# Patient Record
Sex: FEMALE | Race: BLACK OR AFRICAN AMERICAN | NOT HISPANIC OR LATINO | Employment: FULL TIME | URBAN - METROPOLITAN AREA
[De-identification: names, ages, dates, MRNs, and addresses within clinical notes are randomized per-mention and may not be internally consistent; named-entity substitution may affect disease eponyms.]

---

## 2017-01-05 ENCOUNTER — TELEPHONE (OUTPATIENT)
Dept: OBSTETRICS AND GYNECOLOGY | Facility: CLINIC | Age: 22
End: 2017-01-05

## 2017-01-05 NOTE — TELEPHONE ENCOUNTER
----- Message from Ericka Braden sent at 1/5/2017  3:27 PM CST -----  Contact: p[t   X_  1st Request  _  2nd Request  _  3rd Request    Who: Rosemary Chiu     Why: Rosemary Chiu states the patient [employer] is covered and [facility] will be filling the prescription     What Number to Call Back: [phone]    When to Expect a call back: (Before the end of the day)   -- if call after 3:00 call back will be tomorrow.

## 2017-01-24 ENCOUNTER — OFFICE VISIT (OUTPATIENT)
Dept: MATERNAL FETAL MEDICINE | Facility: CLINIC | Age: 22
End: 2017-01-24
Attending: OBSTETRICS & GYNECOLOGY
Payer: MEDICAID

## 2017-01-24 ENCOUNTER — ROUTINE PRENATAL (OUTPATIENT)
Dept: OBSTETRICS AND GYNECOLOGY | Facility: CLINIC | Age: 22
End: 2017-01-24
Payer: MEDICAID

## 2017-01-24 ENCOUNTER — CLINICAL SUPPORT (OUTPATIENT)
Dept: OBSTETRICS AND GYNECOLOGY | Facility: CLINIC | Age: 22
End: 2017-01-24
Payer: MEDICAID

## 2017-01-24 ENCOUNTER — TELEPHONE (OUTPATIENT)
Dept: OBSTETRICS AND GYNECOLOGY | Facility: CLINIC | Age: 22
End: 2017-01-24

## 2017-01-24 VITALS
BODY MASS INDEX: 19.36 KG/M2 | WEIGHT: 119.94 LBS | SYSTOLIC BLOOD PRESSURE: 110 MMHG | DIASTOLIC BLOOD PRESSURE: 72 MMHG

## 2017-01-24 VITALS
BODY MASS INDEX: 19.46 KG/M2 | SYSTOLIC BLOOD PRESSURE: 102 MMHG | DIASTOLIC BLOOD PRESSURE: 70 MMHG | WEIGHT: 120.56 LBS

## 2017-01-24 DIAGNOSIS — O09.212 HIGH RISK PREGNANCY DUE TO HISTORY OF PRETERM LABOR IN SECOND TRIMESTER: ICD-10-CM

## 2017-01-24 DIAGNOSIS — Z34.91 NORMAL PREGNANCY IN FIRST TRIMESTER: Primary | ICD-10-CM

## 2017-01-24 DIAGNOSIS — N88.9 ABNORMAL CERVIX FINDING: Primary | ICD-10-CM

## 2017-01-24 DIAGNOSIS — Z36.89 ENCOUNTER FOR FETAL ANATOMIC SURVEY: ICD-10-CM

## 2017-01-24 DIAGNOSIS — Z87.51 HISTORY OF PRETERM LABOR: Primary | ICD-10-CM

## 2017-01-24 DIAGNOSIS — O09.292 HISTORY OF FETAL ANOMALY IN PRIOR PREGNANCY, CURRENTLY PREGNANT IN SECOND TRIMESTER: ICD-10-CM

## 2017-01-24 PROCEDURE — 99999 PR PBB SHADOW E&M-EST. PATIENT-LVL I: CPT | Mod: PBBFAC,,,

## 2017-01-24 PROCEDURE — 99213 OFFICE O/P EST LOW 20 MIN: CPT | Mod: TH,S$PBB,, | Performed by: OBSTETRICS & GYNECOLOGY

## 2017-01-24 PROCEDURE — 99999 PR PBB SHADOW E&M-EST. PATIENT-LVL I: CPT | Mod: PBBFAC,,, | Performed by: OBSTETRICS & GYNECOLOGY

## 2017-01-24 PROCEDURE — 99999 PR PBB SHADOW E&M-EST. PATIENT-LVL II: CPT | Mod: PBBFAC,,, | Performed by: OBSTETRICS & GYNECOLOGY

## 2017-01-24 PROCEDURE — 99499 UNLISTED E&M SERVICE: CPT | Mod: S$PBB,,, | Performed by: OBSTETRICS & GYNECOLOGY

## 2017-01-24 PROCEDURE — 76811 OB US DETAILED SNGL FETUS: CPT | Mod: PBBFAC | Performed by: OBSTETRICS & GYNECOLOGY

## 2017-01-24 PROCEDURE — 76811 OB US DETAILED SNGL FETUS: CPT | Mod: 26,S$PBB,, | Performed by: OBSTETRICS & GYNECOLOGY

## 2017-01-24 PROCEDURE — 99211 OFF/OP EST MAY X REQ PHY/QHP: CPT | Mod: PBBFAC,27 | Performed by: OBSTETRICS & GYNECOLOGY

## 2017-01-24 RX ADMIN — HYDROXYPROGESTERONE CAPROATE 250 MG: 250 INJECTION INTRAMUSCULAR at 03:01

## 2017-01-24 NOTE — PROGRESS NOTES
Positive fetal movement.  No contractions, vaginal bleeding, or loss of fluid.  Has not received home odilon yet.  Will send patient today to injection center.  Discussed possibility of .  I have not received operative report.  Patient will bring to me next visit.

## 2017-01-24 NOTE — LETTER
January 25, 2017      Maya Sherwood MD  [address] [facility] [address]           [facility]  [address]  [facility] [address]  Phone: [phone]          Patient: Madyson Melendrez   MR Number: [id number]   YOB: 1995   Date of Visit: 1/24/2017       Dear Dr. Maya Sherwood:    Thank you for referring Madyson Melendrez to me for evaluation. Attached you will find relevant portions of my assessment and plan of care.    If you have questions, please do not hesitate to call me. I look forward to following Madyson Melendrez along with you.    Sincerely,    Pete Goyal MD    Enclosure  CC:  No Recipients    If you would like to receive this communication electronically, please contact [email or web address] or [phone] to request more information on [employer] access.    For providers and/or their staff who would like to refer a patient to [facility], please contact us through our one-stop-shop provider referral line, [facility], at [phone].    If you feel you have received this communication in error or would no longer like to receive these types of communications, please e-mail [email or web address]

## 2017-01-24 NOTE — TELEPHONE ENCOUNTER
----- Message from Kulwant De La Cruz sent at 1/24/2017  3:41 PM CST -----  PLEASE CALL PHARMACY TO VERIFY [phone]

## 2017-01-24 NOTE — PROGRESS NOTES
Here for hydroxyprogesterone caproate injection (Apple ) 250 mg/ml. 1 ml./IM  Patient without complaint of pain before or after injection. Advised to wait in lobby 15 minutes after injection. Return in 1 week.     CLINIC SUPPLIED MEDICATION

## 2017-01-25 ENCOUNTER — TELEPHONE (OUTPATIENT)
Dept: OBSTETRICS AND GYNECOLOGY | Facility: CLINIC | Age: 22
End: 2017-01-25

## 2017-01-25 PROBLEM — O09.292 HISTORY OF FETAL ANOMALY IN PRIOR PREGNANCY, CURRENTLY PREGNANT IN SECOND TRIMESTER: Status: ACTIVE | Noted: 2017-01-25

## 2017-01-25 NOTE — TELEPHONE ENCOUNTER
I spoke with patient and told her the [employer] will call to set up home injections and that she is approved starting date 01/14/2017 until 05/30/2017. Patient verbalized understanding

## 2017-01-25 NOTE — PROGRESS NOTES
A detailed fetal anatomical ultrasound was completed today.  See official report in the imaging section of EPIC.  Some views are suboptimal secondary to fetal positioning and decreased.  Ultrasound findings consistent with established dating.  Rescan 4 weeks to complete anatomy.

## 2017-01-25 NOTE — TELEPHONE ENCOUNTER
----- Message from Adenike Salinas sent at 1/25/2017 10:32 AM CST -----  Contact: May from [facility] pharmacy  Rep is calling in regards of the pt's RX ALLAN PRENATAL that was sent to the pharmacy. The rep states that they do not have that RX at [facility] and is wondering if she can prescribe a different kind. The pharmacy can be reached at [phone]. Thanks KG

## 2017-02-02 ENCOUNTER — TELEPHONE (OUTPATIENT)
Dept: OBSTETRICS AND GYNECOLOGY | Facility: CLINIC | Age: 22
End: 2017-02-02

## 2017-02-02 NOTE — TELEPHONE ENCOUNTER
----- Message from Kulwant De La Cruz sent at 2/2/2017  9:14 AM CST -----  PT HAS A QUESTION REGARDING HER INJECTION [phone]

## 2017-02-02 NOTE — TELEPHONE ENCOUNTER
I spoke with patient and she said [employer] called and trold her that her medicaid insurance was canceled. Patient said she would find out why and call back with information.

## 2017-02-20 ENCOUNTER — TELEPHONE (OUTPATIENT)
Dept: OBSTETRICS AND GYNECOLOGY | Facility: CLINIC | Age: 22
End: 2017-02-20

## 2017-02-20 NOTE — TELEPHONE ENCOUNTER
----- Message from Gilda Cisneros MA sent at 2/17/2017  2:47 PM CST -----  Contact: [facility]      ----- Message -----     From: Cain Alvarez     Sent: 2/17/2017  12:30 PM       To: Presley DAWSON Staff    X_ 1st Request  _ 2nd Request  _ 3rd Request    Who:[facility]    Why: [facility] states she would like to speak with staff in regards to having the patient  [facility] medication deliver     What Number to Call Back: [phone] option 1 and 4 to get to department quicker    When to Expect a call back: (Before the end of the day)  -- if call after 3:00 call back will be tomorrow.

## 2017-02-24 ENCOUNTER — CLINICAL SUPPORT (OUTPATIENT)
Dept: OBSTETRICS AND GYNECOLOGY | Facility: CLINIC | Age: 22
End: 2017-02-24
Payer: MEDICAID

## 2017-02-24 ENCOUNTER — LAB VISIT (OUTPATIENT)
Dept: LAB | Facility: OTHER | Age: 22
End: 2017-02-24
Attending: OBSTETRICS & GYNECOLOGY
Payer: MEDICAID

## 2017-02-24 ENCOUNTER — ROUTINE PRENATAL (OUTPATIENT)
Dept: OBSTETRICS AND GYNECOLOGY | Facility: CLINIC | Age: 22
End: 2017-02-24
Payer: MEDICAID

## 2017-02-24 VITALS — WEIGHT: 124.56 LBS | BODY MASS INDEX: 20.1 KG/M2 | SYSTOLIC BLOOD PRESSURE: 110 MMHG | DIASTOLIC BLOOD PRESSURE: 64 MMHG

## 2017-02-24 DIAGNOSIS — Z98.891 HISTORY OF CESAREAN DELIVERY: ICD-10-CM

## 2017-02-24 DIAGNOSIS — Z34.91 NORMAL PREGNANCY IN FIRST TRIMESTER: ICD-10-CM

## 2017-02-24 DIAGNOSIS — O09.212 HIGH RISK PREGNANCY DUE TO HISTORY OF PRETERM LABOR IN SECOND TRIMESTER: ICD-10-CM

## 2017-02-24 DIAGNOSIS — Z87.51 HISTORY OF PRETERM LABOR: Primary | ICD-10-CM

## 2017-02-24 DIAGNOSIS — O09.212 HIGH RISK PREGNANCY DUE TO HISTORY OF PRETERM LABOR IN SECOND TRIMESTER: Primary | ICD-10-CM

## 2017-02-24 PROCEDURE — 99211 OFF/OP EST MAY X REQ PHY/QHP: CPT | Mod: PBBFAC,27

## 2017-02-24 PROCEDURE — 99213 OFFICE O/P EST LOW 20 MIN: CPT | Mod: TH,S$PBB,, | Performed by: OBSTETRICS & GYNECOLOGY

## 2017-02-24 PROCEDURE — 99999 PR PBB SHADOW E&M-EST. PATIENT-LVL II: CPT | Mod: PBBFAC,,, | Performed by: OBSTETRICS & GYNECOLOGY

## 2017-02-24 PROCEDURE — 96372 THER/PROPH/DIAG INJ SC/IM: CPT | Mod: PBBFAC

## 2017-02-24 PROCEDURE — 99999 PR PBB SHADOW E&M-EST. PATIENT-LVL I: CPT | Mod: PBBFAC,,,

## 2017-02-24 RX ADMIN — HYDROXYPROGESTERONE CAPROATE 250 MG: 250 INJECTION INTRAMUSCULAR at 09:02

## 2017-02-24 NOTE — PROGRESS NOTES
Positive fetal movement.  No contractions, vaginal bleeding, or loss of fluid.  Patient started glucose screen today, too early as it is 23w2d.  Will reschedule for 3/6 when patient returns for u/s visit. Will schedule weekly odilon at the injection center today.    Still waiting on op note from Darrick.

## 2017-02-24 NOTE — MR AVS SNAPSHOT
[facility] [address]  [address]  [address]  [facility] [address]  Phone: [phone]  Fax: [phone]                  Madyson Melendrez   2017 9:00 AM   Routine Prenatal    Description:  Female : 1995   Provider:  Maya Sherwood MD   Department:  [facility] [address]           Reason for Visit     Routine Prenatal Visit                To Do List           Future Appointments        Provider Department Dept Phone    2017 9:00 AM Maya Sherwood MD [facility] [address] [phone]    3/6/2017 1:40 PM ULTRASOUND, [facility]TH [facility] CLINIC [facility] [phone]      Goals (5 Years of Data)     None      [facility]     [facility] Nurse Care Line -  Assistance  Registered nurses in the [facility] On [facility] Center provide clinical advisement, health education, appointment booking, and other advisory services.  Call for this free service at [phone].             Medications           Message regarding Medications     Verify the changes and/or additions to your medication regime listed below are the same as discussed with your clinician today.  If any of these changes or additions are incorrect, please notify your healthcare provider.             Verify that the below list of medications is an accurate representation of the medications you are currently taking.  If none reported, the list may be blank. If incorrect, please contact your healthcare provider. Carry this list with you in case of emergency.           Current Medications     PNV with Ca no.36-iron-FA (ALLAN PRENATAL) 13.5-0.4 mg Cap Take 1 tablet by mouth once daily.    PNV WITH CALCIUM,NO.34-IRON-FA ORAL Take 1 tablet by mouth once daily.           Clinical Reference Information           Prenatal Vitals     Enc. Date GA Prenatal Vitals Prenatal Pulse Pain Level Urine Albumin/Glucose Edema Presentation Dilation/Effacement/Station    17 23w2d 110/64 / 56.5 kg (124 lb 9  "oz)   0 Negative / Negative       17 18w6d 110/72 / 54.4 kg (119 lb 14.9 oz)           17 18w6d 102/70 / 54.7 kg (120 lb 9.5 oz)  / +++  0 Negative / Negative       16 15w2d 80/60 (A) / 52 kg (114 lb 10.2 oz)  / +++  0 Negative / Negative None / None      16 11w6d 118/74 / 51.6 kg (113 lb 12.1 oz)           16 11w6d 110/70 / 49 kg (108 lb 0.4 oz)              TW.512 kg (16 lb 9 oz)   Pregravid weight: 49 kg (108 lb)   Number of fetuses: 1   Height: 5' 6" (1.676 m)   BMI: 17.4       Your Vitals Were     BP                   110/64           Allergies as of 2017     Penicillins      Immunizations Administered on Date of Encounter - 2017     None      Language Assistance Services     ATTENTION: Language assistance services are available, free of charge. Please call [phone].      ATENCIÓN: Si habla español, tiene a gill disposición servicios gratuitos de asistencia lingüística. Llame al [phone].     CHÚ Ý: N?u b?n nói Ti?ng Vi?t, có các d?ch v? h? tr? ngôn ng? mi?n phí dành cho b?n. G?i s? [phone].         [facility] [address] complies with applicable Federal civil rights laws and does not discriminate on the basis of race, color, national origin, age, disability, or sex.        "

## 2017-03-01 ENCOUNTER — CLINICAL SUPPORT (OUTPATIENT)
Dept: OBSTETRICS AND GYNECOLOGY | Facility: CLINIC | Age: 22
End: 2017-03-01
Payer: MEDICAID

## 2017-03-01 DIAGNOSIS — Z87.51 HISTORY OF PRETERM LABOR: Primary | ICD-10-CM

## 2017-03-01 PROCEDURE — 99211 OFF/OP EST MAY X REQ PHY/QHP: CPT | Mod: PBBFAC

## 2017-03-01 PROCEDURE — 96372 THER/PROPH/DIAG INJ SC/IM: CPT | Mod: PBBFAC

## 2017-03-01 PROCEDURE — 99999 PR PBB SHADOW E&M-EST. PATIENT-LVL I: CPT | Mod: PBBFAC,,,

## 2017-03-01 RX ADMIN — HYDROXYPROGESTERONE CAPROATE 250 MG: 250 INJECTION INTRAMUSCULAR at 08:03

## 2017-03-01 NOTE — PROGRESS NOTES
Here for hydroxyprogesterone caproate injection (Apple ) 250 mg/ml. 1 ml./IM . ( RB ) Patient without complaint of pain before or after injection. Advised to wait in lobby 15 minutes after injection. Return in 1 week.     Patient SUPPLIED MEDICATION

## 2017-03-06 ENCOUNTER — OFFICE VISIT (OUTPATIENT)
Dept: MATERNAL FETAL MEDICINE | Facility: CLINIC | Age: 22
End: 2017-03-06
Payer: MEDICAID

## 2017-03-06 ENCOUNTER — LAB VISIT (OUTPATIENT)
Dept: LAB | Facility: OTHER | Age: 22
End: 2017-03-06
Attending: OBSTETRICS & GYNECOLOGY
Payer: MEDICAID

## 2017-03-06 DIAGNOSIS — O09.212 HIGH RISK PREGNANCY DUE TO HISTORY OF PRETERM LABOR IN SECOND TRIMESTER: ICD-10-CM

## 2017-03-06 DIAGNOSIS — N88.9 ABNORMAL CERVIX FINDING: ICD-10-CM

## 2017-03-06 LAB
BASOPHILS # BLD AUTO: 0.03 K/UL
BASOPHILS NFR BLD: 0.2 %
DIFFERENTIAL METHOD: ABNORMAL
EOSINOPHIL # BLD AUTO: 0.6 K/UL
EOSINOPHIL NFR BLD: 4.3 %
ERYTHROCYTE [DISTWIDTH] IN BLOOD BY AUTOMATED COUNT: 13.9 %
GLUCOSE SERPL-MCNC: 137 MG/DL
HCT VFR BLD AUTO: 30.8 %
HGB BLD-MCNC: 10 G/DL
LYMPHOCYTES # BLD AUTO: 1.7 K/UL
LYMPHOCYTES NFR BLD: 12.9 %
MCH RBC QN AUTO: 29.9 PG
MCHC RBC AUTO-ENTMCNC: 32.5 %
MCV RBC AUTO: 92 FL
MONOCYTES # BLD AUTO: 0.8 K/UL
MONOCYTES NFR BLD: 6.3 %
NEUTROPHILS # BLD AUTO: 10 K/UL
NEUTROPHILS NFR BLD: 75.1 %
PLATELET # BLD AUTO: 286 K/UL
PMV BLD AUTO: 10.3 FL
RBC # BLD AUTO: 3.35 M/UL
WBC # BLD AUTO: 13.34 K/UL

## 2017-03-06 PROCEDURE — 85025 COMPLETE CBC W/AUTO DIFF WBC: CPT

## 2017-03-06 PROCEDURE — 99499 UNLISTED E&M SERVICE: CPT | Mod: S$PBB,,, | Performed by: PEDIATRICS

## 2017-03-06 PROCEDURE — 82950 GLUCOSE TEST: CPT

## 2017-03-06 PROCEDURE — 76816 OB US FOLLOW-UP PER FETUS: CPT | Mod: 26,S$PBB,, | Performed by: PEDIATRICS

## 2017-03-06 PROCEDURE — 36415 COLL VENOUS BLD VENIPUNCTURE: CPT

## 2017-03-06 NOTE — PROGRESS NOTES
Indication  ========    Evaluation of fetal growth and cervical length.    Method  ======    Transabdominal ultrasound examination. Sufficient.    Pregnancy  =========    Alonzo pregnancy. Number of fetuses: 1.    Dating  ======    LMP on: 9/6/2016  Cycle: regular cycle  GA by LMP 25 w + 6 d  MOUNIKA by LMP: 6/13/2017  Ultrasound examination on: 3/6/2017  GA by U/S based upon: AC, BPD, Femur, HC  GA by U/S 25 w + 0 d  MOUNIKA by U/S: 6/19/2017  Assigned: The calculation of the gestational age based on CRL.  The Best Overall Assessment is based on the ultrasound examination on 12/06/16.  Assigned GA 24 w + 5 d  Assigned MOUNIKA: 6/21/2017    General Evaluation  ==============    Cardiac activity: present.  bpm.  Fetal movements: visualized.  Presentation: breech.  Placenta:  Placental site: posterior, fundal.  Umbilical cord: 3 vessel cord.  Amniotic fluid: normal amount.    Fetal Biometry  ============    Fetal Biometry  BPD 60.1 mm 34% 24w 4d Hadlock  OFD 83.9 mm 98% 27w 2d Tim  .5 mm 51% 25w 2d Hadlock  .8 mm 30% 24w 2d Hadlock  Femur 47.5 mm 73% 25w 6d Hadlock  Cerebellum tr 28.4 mm 85% 26w 2d Lopez  CM 6.1 mm 50% Nicolaides   g 53% 24w 5d Hadlock  Calculated by: Hadlock (BPD-HC-AC-FL)  EFW (lb) 1 lb  EFW (oz) 11 oz  Cephalic index 0.72 2% Nicolaides  HC / AC 1.18  FL / BPD 0.79  FL / AC 0.24  MVP 5.0 cm   bpm  Extremities / Bony Struc  Spine 3.7 mm    Fetal Anatomy  ============    Cranium: normal  Lateral ventricles: normal  Choroid plexus: documented previously  Midline falx: documented previously  Cavum septi pellucidi: normal  Cerebellum: normal  Cisterna magna: normal  Lips: normal  Profile: normal  Nose: normal  4-chamber view: normal  RVOT: normal  LVOT: normal  Situs: normal  Aortic arch: normal  Ductal arch: normal  SVC: normal  IVC: normal  Cord insertion: documented previously  Stomach: normal  Kidneys: normal  Bladder: normal  Genitals: normal  Cervical  spine: normal  Thoracic spine: normal  Lumbar spine: normal  Sacral spine: normal  Arms: documented previously  Legs: documented previously  Gender: female  Wants to know gender: yes    Maternal Structures  ===============    Uterus / Cervix  Cervix: Normal  Approach: w/o pressure  Cervical length 44.9 mm  Second approach: with pressure  Cervical length (2) 44.4 mm    Impression  =========    Limited anatomy was negative. No anomalies seen. Anatomic survey is completed.    Fetal biometry is consistent and concordant with dating.    AFV normal.        Recommendation  ==============    Follow-up as clinically indicated.    Thanks once again for allowing us to participate in the care of your patients. If you have any questions concerning today's consultation feel free  to contact me or one of my partners. We can be reached at [phone] during normal business hours. If you have a question after normal  business hours, please contact Labor and Delivery [phone] and the unit secretary will page our on call physician.

## 2017-03-08 ENCOUNTER — CLINICAL SUPPORT (OUTPATIENT)
Dept: OBSTETRICS AND GYNECOLOGY | Facility: CLINIC | Age: 22
End: 2017-03-08
Payer: MEDICAID

## 2017-03-08 DIAGNOSIS — Z87.51 HISTORY OF PRETERM LABOR: Primary | ICD-10-CM

## 2017-03-08 PROCEDURE — 96372 THER/PROPH/DIAG INJ SC/IM: CPT | Mod: PBBFAC

## 2017-03-08 PROCEDURE — 99999 PR PBB SHADOW E&M-EST. PATIENT-LVL I: CPT | Mod: PBBFAC,,,

## 2017-03-08 PROCEDURE — 99211 OFF/OP EST MAY X REQ PHY/QHP: CPT | Mod: PBBFAC

## 2017-03-08 RX ADMIN — HYDROXYPROGESTERONE CAPROATE 250 MG: 250 INJECTION INTRAMUSCULAR at 08:03

## 2017-03-08 NOTE — PROGRESS NOTES
Here for hydroxyprogesterone caproate injection (Apple ) 250 mg/ml. 1 ml./IM . ( LB ) Patient without complaint of pain before or after injection. Advised to wait in lobby 15 minutes after injection. Return in 1 week.     Patient SUPPLIED MEDICATION

## 2017-03-13 ENCOUNTER — PATIENT MESSAGE (OUTPATIENT)
Dept: OBSTETRICS AND GYNECOLOGY | Facility: CLINIC | Age: 22
End: 2017-03-13

## 2017-03-15 ENCOUNTER — CLINICAL SUPPORT (OUTPATIENT)
Dept: OBSTETRICS AND GYNECOLOGY | Facility: CLINIC | Age: 22
End: 2017-03-15
Payer: MEDICAID

## 2017-03-15 DIAGNOSIS — Z87.51 HISTORY OF PRETERM LABOR: Primary | ICD-10-CM

## 2017-03-15 PROCEDURE — 99999 PR PBB SHADOW E&M-EST. PATIENT-LVL I: CPT | Mod: PBBFAC,,,

## 2017-03-15 PROCEDURE — 99211 OFF/OP EST MAY X REQ PHY/QHP: CPT | Mod: PBBFAC,25

## 2017-03-15 PROCEDURE — 96372 THER/PROPH/DIAG INJ SC/IM: CPT | Mod: PBBFAC

## 2017-03-15 RX ADMIN — HYDROXYPROGESTERONE CAPROATE 250 MG: 250 INJECTION INTRAMUSCULAR at 08:03

## 2017-03-22 ENCOUNTER — CLINICAL SUPPORT (OUTPATIENT)
Dept: OBSTETRICS AND GYNECOLOGY | Facility: CLINIC | Age: 22
End: 2017-03-22
Payer: MEDICAID

## 2017-03-22 PROCEDURE — 99211 OFF/OP EST MAY X REQ PHY/QHP: CPT | Mod: PBBFAC

## 2017-03-22 PROCEDURE — 99999 PR PBB SHADOW E&M-EST. PATIENT-LVL I: CPT | Mod: PBBFAC,,,

## 2017-03-22 PROCEDURE — 96372 THER/PROPH/DIAG INJ SC/IM: CPT | Mod: PBBFAC

## 2017-03-22 RX ADMIN — HYDROXYPROGESTERONE CAPROATE 250 MG: 250 INJECTION INTRAMUSCULAR at 11:03

## 2017-03-24 ENCOUNTER — ROUTINE PRENATAL (OUTPATIENT)
Dept: OBSTETRICS AND GYNECOLOGY | Facility: CLINIC | Age: 22
End: 2017-03-24
Payer: MEDICAID

## 2017-03-24 VITALS — BODY MASS INDEX: 20.74 KG/M2 | WEIGHT: 128.5 LBS | SYSTOLIC BLOOD PRESSURE: 102 MMHG | DIASTOLIC BLOOD PRESSURE: 62 MMHG

## 2017-03-24 DIAGNOSIS — Z34.92 NORMAL PREGNANCY IN SECOND TRIMESTER: Primary | ICD-10-CM

## 2017-03-24 DIAGNOSIS — O09.212 HIGH RISK PREGNANCY DUE TO HISTORY OF PRETERM LABOR IN SECOND TRIMESTER: ICD-10-CM

## 2017-03-24 PROCEDURE — 99212 OFFICE O/P EST SF 10 MIN: CPT | Mod: PBBFAC | Performed by: OBSTETRICS & GYNECOLOGY

## 2017-03-24 PROCEDURE — 99213 OFFICE O/P EST LOW 20 MIN: CPT | Mod: TH,S$PBB,, | Performed by: OBSTETRICS & GYNECOLOGY

## 2017-03-24 PROCEDURE — 99999 PR PBB SHADOW E&M-EST. PATIENT-LVL II: CPT | Mod: PBBFAC,,, | Performed by: OBSTETRICS & GYNECOLOGY

## 2017-03-24 NOTE — PROGRESS NOTES
Good fetal movement.  No contractions, no vaginal bleeding, and no loss of fluid.  Still no operative report from Darrick.  Will resign and refax form today.

## 2017-03-24 NOTE — MR AVS SNAPSHOT
[facility] [address]  [address]  [address]  [facility] [address]  Phone: [phone]  Fax: [phone]                  Madyson Melendrez   3/24/2017 9:45 AM   Routine Prenatal    Description:  Female : 1995   Provider:  Maya Sherwood MD   Department:  [facility] [address]           Reason for Visit     Routine Prenatal Visit                To Do List           Future Appointments        Provider Department Dept Phone    3/24/2017 9:45 AM Maya Sherwood MD [facility] [address] [phone]    3/29/2017 8:00 AM GYN, INJECTION [facility] [address] [phone]    2017 8:00 AM GYN, INJECTION [facility] [address] [phone]    2017 8:00 AM GYN, INJECTION [facility] [address] [phone]    2017 8:00 AM GYN, INJECTION [facility] [address] [phone]      Goals (5 Years of Data)     None      [facility]     [facility] On Call Nurse [facility] Emergency [facility] Line -  Assistance  Registered nurses in the [facility] On Call Center provide clinical advisement, health education, appointment booking, and other advisory services.  Call for this free service at [phone].             Medications           Message regarding Medications     Verify the changes and/or additions to your medication regime listed below are the same as discussed with your clinician today.  If any of these changes or additions are incorrect, please notify your healthcare provider.             Verify that the below list of medications is an accurate representation of the medications you are currently taking.  If none reported, the list may be blank. If incorrect, please contact your healthcare provider. Carry this list with you in case of emergency.           Current Medications     PNV with Ca no.36-iron-FA (ALLAN PRENATAL) 13.5-0.4 mg Cap Take 1 tablet by mouth once daily.    PNV WITH CALCIUM,NO.34-IRON-FA ORAL Take 1 tablet by mouth once daily.           Clinical Reference  "Information           Prenatal Vitals     Enc. Date GA Prenatal Vitals Prenatal Pulse Pain Level Urine Albumin/Glucose Edema Presentation Dilation/Effacement/Station    3/24/17 27w2d 102/62 / 58.3 kg (128 lb 8.5 oz)   0 Negative / Negative       17 23w2d 110/64 / 56.5 kg (124 lb 9 oz) 23 cm / +++ / Present  0 Negative / Negative None / None      17 18w6d 110/72 / 54.4 kg (119 lb 14.9 oz)           17 18w6d 102/70 / 54.7 kg (120 lb 9.5 oz)  / +++  0 Negative / Negative       16 15w2d 80/60 (A) / 52 kg (114 lb 10.2 oz)  / +++  0 Negative / Negative None / None      16 11w6d 118/74 / 51.6 kg (113 lb 12.1 oz)           16 11w6d 110/70 / 49 kg (108 lb 0.4 oz)              TW.311 kg (20 lb 8.5 oz)   Pregravid weight: 49 kg (108 lb)   Number of fetuses: 1   Height: 5' 6" (1.676 m)   BMI: 17.4       Your Vitals Were     BP Weight Last Period BMI       102/62 58.3 kg (128 lb 8.5 oz) 2016 20.74 kg/m2       Allergies as of 3/24/2017     Penicillins      Immunizations Administered on Date of Encounter - 3/24/2017     None      Language Assistance Services     ATTENTION: Language assistance services are available, free of charge. Please call [phone].      ATENCIÓN: Si habla ten, tiene a gill disposición servicios gratuitos de asistencia lingüística. Llame al [phone].     CHÚ Ý: N?u b?n nói Ti?ng Vi?t, có các d?ch v? h? tr? ngôn ng? mi?n phí dành cho b?n. G?i s? [phone].         [facility] [address] complies with applicable Federal civil rights laws and does not discriminate on the basis of race, color, national origin, age, disability, or sex.        "

## 2017-04-05 ENCOUNTER — CLINICAL SUPPORT (OUTPATIENT)
Dept: OBSTETRICS AND GYNECOLOGY | Facility: CLINIC | Age: 22
End: 2017-04-05
Payer: MEDICAID

## 2017-04-05 DIAGNOSIS — Z87.51 HISTORY OF PRETERM LABOR: Primary | ICD-10-CM

## 2017-04-05 PROCEDURE — 99211 OFF/OP EST MAY X REQ PHY/QHP: CPT | Mod: PBBFAC

## 2017-04-05 PROCEDURE — 96372 THER/PROPH/DIAG INJ SC/IM: CPT | Mod: PBBFAC

## 2017-04-05 PROCEDURE — 99999 PR PBB SHADOW E&M-EST. PATIENT-LVL I: CPT | Mod: PBBFAC,,,

## 2017-04-05 RX ADMIN — HYDROXYPROGESTERONE CAPROATE 250 MG: 250 INJECTION INTRAMUSCULAR at 08:04

## 2017-04-11 ENCOUNTER — ROUTINE PRENATAL (OUTPATIENT)
Dept: OBSTETRICS AND GYNECOLOGY | Facility: CLINIC | Age: 22
End: 2017-04-11
Payer: MEDICAID

## 2017-04-11 VITALS
SYSTOLIC BLOOD PRESSURE: 108 MMHG | BODY MASS INDEX: 21.24 KG/M2 | WEIGHT: 131.63 LBS | DIASTOLIC BLOOD PRESSURE: 64 MMHG

## 2017-04-11 DIAGNOSIS — D50.8 IRON DEFICIENCY ANEMIA SECONDARY TO INADEQUATE DIETARY IRON INTAKE: ICD-10-CM

## 2017-04-11 DIAGNOSIS — O26.899 PELVIC PAIN AFFECTING PREGNANCY: ICD-10-CM

## 2017-04-11 DIAGNOSIS — Z34.92 NORMAL PREGNANCY IN SECOND TRIMESTER: Primary | ICD-10-CM

## 2017-04-11 DIAGNOSIS — R10.2 PELVIC PAIN AFFECTING PREGNANCY: ICD-10-CM

## 2017-04-11 DIAGNOSIS — O09.212 HIGH RISK PREGNANCY DUE TO HISTORY OF PRETERM LABOR IN SECOND TRIMESTER: ICD-10-CM

## 2017-04-11 PROCEDURE — 99999 PR PBB SHADOW E&M-EST. PATIENT-LVL II: CPT | Mod: PBBFAC,,, | Performed by: OBSTETRICS & GYNECOLOGY

## 2017-04-11 PROCEDURE — 99212 OFFICE O/P EST SF 10 MIN: CPT | Mod: PBBFAC | Performed by: OBSTETRICS & GYNECOLOGY

## 2017-04-11 PROCEDURE — 99213 OFFICE O/P EST LOW 20 MIN: CPT | Mod: TH,S$PBB,, | Performed by: OBSTETRICS & GYNECOLOGY

## 2017-04-11 RX ORDER — FERROUS SULFATE 325(65) MG
325 TABLET ORAL DAILY
Qty: 30 TABLET | Refills: 3 | Status: SHIPPED | OUTPATIENT
Start: 2017-04-11 | End: 2018-04-11

## 2017-04-11 NOTE — MR AVS SNAPSHOT
[facility] [address]  [address]  [address]  [facility] [address]  Phone: [phone]  Fax: [phone]                  Madyson Melendrez   2017 8:45 AM   Routine Prenatal    Description:  Female : 1995   Provider:  Maya Sherwood MD   Department:  [facility] [address]           Reason for Visit     Routine Prenatal Visit                To Do List           Future Appointments        Provider Department Dept Phone    2017 8:00 AM GYN, INJECTION [facility] [address] [phone]    2017 8:00 AM GYN, INJECTION [facility] [address] [phone]      Goals (5 Years of Data)     None      [facility]     [facility] On Call Nurse Care Line -  Assistance  Unless otherwise directed by your provider, please contact [facility], our nurse care line that is available for  assistance.     Registered nurses in the [facility] On Call Center provide: appointment scheduling, clinical advisement, health education, and other advisory services.  Call: [phone] (toll free)               Medications           Message regarding Medications     Verify the changes and/or additions to your medication regime listed below are the same as discussed with your clinician today.  If any of these changes or additions are incorrect, please notify your healthcare provider.        STOP taking these medications     PNV WITH CALCIUM,NO.34-IRON-FA ORAL Take 1 tablet by mouth once daily.           Verify that the below list of medications is an accurate representation of the medications you are currently taking.  If none reported, the list may be blank. If incorrect, please contact your healthcare provider. Carry this list with you in case of emergency.           Current Medications     PNV with Ca no.36-iron-FA (ALLAN PRENATAL) 13.5-0.4 mg Cap Take 1 tablet by mouth once daily.           Clinical Reference Information           Prenatal Vitals     Enc. Date GA Prenatal  "Vitals Prenatal Pulse Pain Level Urine Albumin/Glucose Edema Presentation Dilation/Effacement/Station    4/11/17 29w6d 108/64 / 59.7 kg (131 lb 9.8 oz)    1+ / Negative       3/24/17 27w2d 102/62 / 58.3 kg (128 lb 8.5 oz) 26 cm / +++ / Present  0 Negative / Negative       2/24/17 23w2d 110/64 / 56.5 kg (124 lb 9 oz) 23 cm / +++ / Present  0 Negative / Negative None / None      1/24/17 18w6d 110/72 / 54.4 kg (119 lb 14.9 oz)           1/24/17 18w6d 102/70 / 54.7 kg (120 lb 9.5 oz)  / +++  0 Negative / Negative       12/30/16 15w2d 80/60 (A) / 52 kg (114 lb 10.2 oz)  / +++  0 Negative / Negative None / None      12/6/16 11w6d 118/74 / 51.6 kg (113 lb 12.1 oz)           11/28/16 11w6d 110/70 / 49 kg (108 lb 0.4 oz)              TWG: 10.7 kg (23 lb 9.8 oz)   Pregravid weight: 49 kg (108 lb)   Number of fetuses: 1   Height: 5' 6" (1.676 m)   BMI: 17.4       Your Vitals Were     BP Weight Last Period BMI       108/64 59.7 kg (131 lb 9.8 oz) 09/06/2016 21.24 kg/m2       Allergies as of 4/11/2017     Penicillins      Immunizations Administered on Date of Encounter - 4/11/2017     None      Language Assistance Services     ATTENTION: Language assistance services are available, free of charge. Please call [phone].      ATENCIÓN: Si habla español, tiene a gill disposición servicios gratuitos de asistencia lingüística. Llame al [phone].     CHÚ Ý: N?u b?n nói Ti?ng Vi?t, có các d?ch v? h? tr? ngôn ng? mi?n phí dành cho b?n. G?i s? [phone].         [facility] [address] complies with applicable Federal civil rights laws and does not discriminate on the basis of race, color, national origin, age, disability, or sex.        "

## 2017-04-11 NOTE — PROGRESS NOTES
Good fetal movement.  No contractions, no vaginal bleeding, and no loss of fluid.  Cotati injections continuing.  Patient reports some round ligament pain.    Plans to get operative report today and bring back to me.

## 2017-04-12 ENCOUNTER — CLINICAL SUPPORT (OUTPATIENT)
Dept: OBSTETRICS AND GYNECOLOGY | Facility: CLINIC | Age: 22
End: 2017-04-12
Payer: MEDICAID

## 2017-04-12 DIAGNOSIS — Z23 NEED FOR TDAP VACCINATION: Primary | ICD-10-CM

## 2017-04-12 DIAGNOSIS — Z87.51 HISTORY OF PRETERM LABOR: ICD-10-CM

## 2017-04-12 PROCEDURE — 90715 TDAP VACCINE 7 YRS/> IM: CPT | Mod: PBBFAC,SL

## 2017-04-12 PROCEDURE — 96372 THER/PROPH/DIAG INJ SC/IM: CPT | Mod: PBBFAC

## 2017-04-12 PROCEDURE — 90471 IMMUNIZATION ADMIN: CPT | Mod: PBBFAC,VFC

## 2017-04-12 RX ADMIN — HYDROXYPROGESTERONE CAPROATE 250 MG: 250 INJECTION INTRAMUSCULAR at 09:04

## 2017-04-12 NOTE — PROGRESS NOTES
Here for hydroxyprogesterone caproate injection (Apple ) 250 mg/ml. 1 ml./IM . ( RB ) Patient without complaint of pain before or after injection. Advised to wait in lobby 15 minutes after injection. Return in 1 week.       Tdap injection given, patient tolerated well.

## 2017-04-19 ENCOUNTER — CLINICAL SUPPORT (OUTPATIENT)
Dept: OBSTETRICS AND GYNECOLOGY | Facility: CLINIC | Age: 22
End: 2017-04-19
Payer: MEDICAID

## 2017-04-19 DIAGNOSIS — Z87.51 HISTORY OF PRETERM LABOR: Primary | ICD-10-CM

## 2017-04-19 PROCEDURE — 99211 OFF/OP EST MAY X REQ PHY/QHP: CPT | Mod: PBBFAC

## 2017-04-19 PROCEDURE — 96372 THER/PROPH/DIAG INJ SC/IM: CPT | Mod: PBBFAC

## 2017-04-19 PROCEDURE — 99999 PR PBB SHADOW E&M-EST. PATIENT-LVL I: CPT | Mod: PBBFAC,,,

## 2017-04-19 RX ADMIN — HYDROXYPROGESTERONE CAPROATE 250 MG: 250 INJECTION INTRAMUSCULAR at 08:04

## 2017-04-19 NOTE — PROGRESS NOTES
Here for hydroxyprogesterone caproate injection (Apple ) 250 mg/ml. 1 ml./IM . (RB ) Patient without complaint of pain before or after injection. Advised to wait in lobby 15 minutes after injection. Return in 1 week.     Patient SUPPLIED MEDICATION

## 2017-04-25 ENCOUNTER — ROUTINE PRENATAL (OUTPATIENT)
Dept: OBSTETRICS AND GYNECOLOGY | Facility: CLINIC | Age: 22
End: 2017-04-25
Payer: MEDICAID

## 2017-04-25 VITALS — BODY MASS INDEX: 21.71 KG/M2 | SYSTOLIC BLOOD PRESSURE: 104 MMHG | WEIGHT: 134.5 LBS | DIASTOLIC BLOOD PRESSURE: 64 MMHG

## 2017-04-25 DIAGNOSIS — Z34.92 NORMAL PREGNANCY IN SECOND TRIMESTER: Primary | ICD-10-CM

## 2017-04-25 PROCEDURE — 99213 OFFICE O/P EST LOW 20 MIN: CPT | Mod: TH,S$PBB,, | Performed by: OBSTETRICS & GYNECOLOGY

## 2017-04-25 PROCEDURE — 99212 OFFICE O/P EST SF 10 MIN: CPT | Mod: PBBFAC | Performed by: OBSTETRICS & GYNECOLOGY

## 2017-04-25 PROCEDURE — 99999 PR PBB SHADOW E&M-EST. PATIENT-LVL II: CPT | Mod: PBBFAC,,, | Performed by: OBSTETRICS & GYNECOLOGY

## 2017-04-25 RX ORDER — HYDROXYPROGESTERONE CAPROATE 250 MG/ML
INJECTION INTRAMUSCULAR
COMMUNITY
Start: 2017-04-11 | End: 2017-07-28

## 2017-04-25 NOTE — MR AVS SNAPSHOT
[facility] [address]  [address]  [address]  [facility] [address]  Phone: [phone]  Fax: [phone]                  Madyson Melendrez   2017 3:15 PM   Routine Prenatal    Description:  Female : 1995   Provider:  Maya Sherwood MD   Department:  [facility] [address]           Reason for Visit     Routine Prenatal Visit                To Do List           Goals (5 Years of Data)     None      [facility]     [facility] On Call Nurse Care Line -  Assistance  Unless otherwise directed by your provider, please contact [facility], our nurse care line that is available for  assistance.     Registered nurses in the [facility] On Call Center provide: appointment scheduling, clinical advisement, health education, and other advisory services.  Call: [phone] (toll free)               Medications           Message regarding Medications     Verify the changes and/or additions to your medication regime listed below are the same as discussed with your clinician today.  If any of these changes or additions are incorrect, please notify your healthcare provider.             Verify that the below list of medications is an accurate representation of the medications you are currently taking.  If none reported, the list may be blank. If incorrect, please contact your healthcare provider. Carry this list with you in case of emergency.           Current Medications     ferrous sulfate 325 mg (65 mg iron) Tab tablet Take 1 tablet (325 mg total) by mouth once daily.    SAWYER 250 mg/mL (1 mL) Oil     PNV with Ca no.36-iron-FA (ALLAN PRENATAL) 13.5-0.4 mg Cap Take 1 tablet by mouth once daily.           Clinical Reference Information           Prenatal Vitals     Enc. Date GA Prenatal Vitals Prenatal Pulse Pain Level Urine Albumin/Glucose Edema Presentation Dilation/Effacement/Station    17 31w6d 104/64 / 61 kg (134 lb 7.7 oz)   0 1+ / Negative       17 29w6d 108/64 /  "59.7 kg (131 lb 9.8 oz) 28 cm / +++ / Present  0 1+ / Negative None / None      3/24/17 27w2d 102/62 / 58.3 kg (128 lb 8.5 oz) 26 cm / +++ / Present  0 Negative / Negative       17 23w2d 110/64 / 56.5 kg (124 lb 9 oz) 23 cm / +++ / Present  0 Negative / Negative None / None      17 18w6d 110/72 / 54.4 kg (119 lb 14.9 oz)           17 18w6d 102/70 / 54.7 kg (120 lb 9.5 oz)  / +++  0 Negative / Negative       16 15w2d 80/60 (A) / 52 kg (114 lb 10.2 oz)  / +++  0 Negative / Negative None / None      16 11w6d 118/74 / 51.6 kg (113 lb 12.1 oz)           16 11w6d 110/70 / 49 kg (108 lb 0.4 oz)              TW kg (26 lb 7.7 oz)   Pregravid weight: 49 kg (108 lb)   Number of fetuses: 1   Height: 5' 6" (1.676 m)   BMI: 17.4       Your Vitals Were     BP Weight Last Period BMI       104/64 61 kg (134 lb 7.7 oz) 2016 21.71 kg/m2       Allergies as of 2017     Penicillins      Immunizations Administered on Date of Encounter - 2017     None      Language Assistance Services     ATTENTION: Language assistance services are available, free of charge. Please call [phone].      ATENCIÓN: Si habla ten, tiene a gill disposición servicios gratuitos de asistencia lingüística. Llame al [phone].     CHÚ Ý: N?u b?n nói Ti?ng Vi?t, có các d?ch v? h? tr? ngôn ng? mi?n phí dành cho b?n. G?i s? [phone].         [facility] [address] complies with applicable Federal civil rights laws and does not discriminate on the basis of race, color, national origin, age, disability, or sex.        "

## 2017-04-25 NOTE — PROGRESS NOTES
Good fetal movement.  No contractions, no vaginal bleeding, and no loss of fluid.  Some occasional right sided hip pain.

## 2017-04-26 ENCOUNTER — CLINICAL SUPPORT (OUTPATIENT)
Dept: OBSTETRICS AND GYNECOLOGY | Facility: CLINIC | Age: 22
End: 2017-04-26
Payer: MEDICAID

## 2017-04-26 DIAGNOSIS — Z87.51 HISTORY OF PRETERM LABOR: Primary | ICD-10-CM

## 2017-04-26 PROCEDURE — 99999 PR PBB SHADOW E&M-EST. PATIENT-LVL I: CPT | Mod: PBBFAC,,,

## 2017-04-26 PROCEDURE — 99211 OFF/OP EST MAY X REQ PHY/QHP: CPT | Mod: PBBFAC

## 2017-04-26 PROCEDURE — 96372 THER/PROPH/DIAG INJ SC/IM: CPT | Mod: PBBFAC

## 2017-04-26 RX ADMIN — HYDROXYPROGESTERONE CAPROATE 250 MG: 250 INJECTION INTRAMUSCULAR at 08:04

## 2017-05-03 ENCOUNTER — CLINICAL SUPPORT (OUTPATIENT)
Dept: OBSTETRICS AND GYNECOLOGY | Facility: CLINIC | Age: 22
End: 2017-05-03
Payer: MEDICAID

## 2017-05-03 DIAGNOSIS — Z87.51 HISTORY OF PRETERM LABOR: Primary | ICD-10-CM

## 2017-05-03 PROCEDURE — 99999 PR PBB SHADOW E&M-EST. PATIENT-LVL I: CPT | Mod: PBBFAC,,,

## 2017-05-03 PROCEDURE — 96372 THER/PROPH/DIAG INJ SC/IM: CPT | Mod: PBBFAC

## 2017-05-03 PROCEDURE — 99211 OFF/OP EST MAY X REQ PHY/QHP: CPT | Mod: PBBFAC

## 2017-05-03 RX ADMIN — HYDROXYPROGESTERONE CAPROATE 250 MG: 250 INJECTION INTRAMUSCULAR at 08:05

## 2017-05-09 ENCOUNTER — ROUTINE PRENATAL (OUTPATIENT)
Dept: OBSTETRICS AND GYNECOLOGY | Facility: CLINIC | Age: 22
End: 2017-05-09
Payer: MEDICAID

## 2017-05-09 VITALS
WEIGHT: 135.13 LBS | BODY MASS INDEX: 21.81 KG/M2 | DIASTOLIC BLOOD PRESSURE: 80 MMHG | SYSTOLIC BLOOD PRESSURE: 102 MMHG

## 2017-05-09 DIAGNOSIS — Z34.92 NORMAL PREGNANCY IN SECOND TRIMESTER: Primary | ICD-10-CM

## 2017-05-09 DIAGNOSIS — O09.212 HIGH RISK PREGNANCY DUE TO HISTORY OF PRETERM LABOR IN SECOND TRIMESTER: ICD-10-CM

## 2017-05-09 PROCEDURE — 99999 PR PBB SHADOW E&M-EST. PATIENT-LVL II: CPT | Mod: PBBFAC,,, | Performed by: OBSTETRICS & GYNECOLOGY

## 2017-05-09 PROCEDURE — 99213 OFFICE O/P EST LOW 20 MIN: CPT | Mod: TH,S$PBB,, | Performed by: OBSTETRICS & GYNECOLOGY

## 2017-05-09 PROCEDURE — 99212 OFFICE O/P EST SF 10 MIN: CPT | Mod: PBBFAC | Performed by: OBSTETRICS & GYNECOLOGY

## 2017-05-09 NOTE — MR AVS SNAPSHOT
[facility] [address]  [address]  [address]  [facility] [address]  Phone: [phone]  Fax: [phone]                  Madyson Melendrez   2017 3:30 PM   Routine Prenatal    Description:  Female : 1995   Provider:  Maya Sherwood MD   Department:  [facility] [address]           Reason for Visit     Routine Prenatal Visit                To Do List           Future Appointments        Provider Department Dept Phone    5/10/2017 8:00 AM GYN, INJECTION [facility] [address] [phone]    2017 8:00 AM GYN, INJECTION [facility] [address] [phone]    2017 8:00 AM GYN, INJECTION [facility] [address] [phone]      Goals (5 Years of Data)     None      [facility]     [facility] Nurse Care Line -  Assistance  Unless otherwise directed by your provider, please contact [facility], our nurse care line that is available for  assistance.     Registered nurses in the [facility] Center provide: appointment scheduling, clinical advisement, health education, and other advisory services.  Call: [phone] (toll free)               Medications           Message regarding Medications     Verify the changes and/or additions to your medication regime listed below are the same as discussed with your clinician today.  If any of these changes or additions are incorrect, please notify your healthcare provider.             Verify that the below list of medications is an accurate representation of the medications you are currently taking.  If none reported, the list may be blank. If incorrect, please contact your healthcare provider. Carry this list with you in case of emergency.           Current Medications     ferrous sulfate 325 mg (65 mg iron) Tab tablet Take 1 tablet (325 mg total) by mouth once daily.    SAWYER 250 mg/mL (1 mL) Oil     PNV with Ca no.36-iron-FA (ALLAN PRENATAL) 13.5-0.4 mg Cap Take 1 tablet by mouth once daily.  "          Clinical Reference Information           Prenatal Vitals     Enc. Date GA Prenatal Vitals Prenatal Pulse Pain Level Urine Albumin/Glucose Edema Presentation Dilation/Effacement/Station    17 33w6d 102/80 / 61.3 kg (135 lb 2.3 oz)   0 Negative / Negative       17 31w6d 104/64 / 61 kg (134 lb 7.7 oz) 29 cm / +++ / Present  0 1+ / Negative       17 29w6d 108/64 / 59.7 kg (131 lb 9.8 oz) 28 cm / +++ / Present  0 1+ / Negative None / None      3/24/17 27w2d 102/62 / 58.3 kg (128 lb 8.5 oz) 26 cm / +++ / Present  0 Negative / Negative       17 23w2d 110/64 / 56.5 kg (124 lb 9 oz) 23 cm / +++ / Present  0 Negative / Negative None / None      17 18w6d 110/72 / 54.4 kg (119 lb 14.9 oz)           17 18w6d 102/70 / 54.7 kg (120 lb 9.5 oz)  / +++  0 Negative / Negative       16 15w2d 80/60 (A) / 52 kg (114 lb 10.2 oz)  / +++  0 Negative / Negative None / None      16 11w6d 118/74 / 51.6 kg (113 lb 12.1 oz)           16 11w6d 110/70 / 49 kg (108 lb 0.4 oz)              TW.3 kg (27 lb 2.3 oz)   Pregravid weight: 49 kg (108 lb)   Number of fetuses: 1   Height: 5' 6" (1.676 m)   BMI: 17.4       Your Vitals Were     BP Weight Last Period BMI       102/80 61.3 kg (135 lb 2.3 oz) 2016 21.81 kg/m2       Allergies as of 2017     Penicillins      Immunizations Administered on Date of Encounter - 2017     None      Language Assistance Services     ATTENTION: Language assistance services are available, free of charge. Please call [phone].      ATENCIÓN: Si antoine caal, tiene a gill disposición servicios gratuitos de asistencia lingüística. Iam al [phone].     SHARAN Ý: N?u b?n nói Ti?ng Vi?t, có các d?ch v? h? tr? ngôn ng? mi?n phí dành cho b?n. G?i s? [phone].         [facility] [address] complies with applicable Federal civil rights laws and does not discriminate on the basis of race, color, national origin, age, disability, or sex.      "

## 2017-05-10 ENCOUNTER — CLINICAL SUPPORT (OUTPATIENT)
Dept: OBSTETRICS AND GYNECOLOGY | Facility: CLINIC | Age: 22
End: 2017-05-10
Payer: MEDICAID

## 2017-05-10 DIAGNOSIS — Z87.51 HISTORY OF PRETERM LABOR: Primary | ICD-10-CM

## 2017-05-10 PROCEDURE — 99999 PR PBB SHADOW E&M-EST. PATIENT-LVL I: CPT | Mod: PBBFAC,,,

## 2017-05-10 PROCEDURE — 99211 OFF/OP EST MAY X REQ PHY/QHP: CPT | Mod: PBBFAC,25

## 2017-05-10 PROCEDURE — 96372 THER/PROPH/DIAG INJ SC/IM: CPT | Mod: PBBFAC

## 2017-05-10 RX ADMIN — HYDROXYPROGESTERONE CAPROATE 250 MG: 250 INJECTION INTRAMUSCULAR at 08:05

## 2017-05-17 ENCOUNTER — CLINICAL SUPPORT (OUTPATIENT)
Dept: OBSTETRICS AND GYNECOLOGY | Facility: CLINIC | Age: 22
End: 2017-05-17
Payer: MEDICAID

## 2017-05-17 DIAGNOSIS — Z87.51 HISTORY OF PRETERM LABOR: Primary | ICD-10-CM

## 2017-05-17 PROCEDURE — 99211 OFF/OP EST MAY X REQ PHY/QHP: CPT | Mod: PBBFAC,25

## 2017-05-17 PROCEDURE — 99999 PR PBB SHADOW E&M-EST. PATIENT-LVL I: CPT | Mod: PBBFAC,,,

## 2017-05-17 PROCEDURE — 96372 THER/PROPH/DIAG INJ SC/IM: CPT | Mod: PBBFAC

## 2017-05-17 RX ADMIN — HYDROXYPROGESTERONE CAPROATE 250 MG: 250 INJECTION INTRAMUSCULAR at 08:05

## 2017-05-23 ENCOUNTER — LAB VISIT (OUTPATIENT)
Dept: LAB | Facility: OTHER | Age: 22
End: 2017-05-23
Attending: OBSTETRICS & GYNECOLOGY
Payer: MEDICAID

## 2017-05-23 ENCOUNTER — ROUTINE PRENATAL (OUTPATIENT)
Dept: OBSTETRICS AND GYNECOLOGY | Facility: CLINIC | Age: 22
End: 2017-05-23
Payer: MEDICAID

## 2017-05-23 VITALS
WEIGHT: 137.38 LBS | SYSTOLIC BLOOD PRESSURE: 104 MMHG | DIASTOLIC BLOOD PRESSURE: 70 MMHG | BODY MASS INDEX: 22.17 KG/M2

## 2017-05-23 DIAGNOSIS — O09.212 HIGH RISK PREGNANCY DUE TO HISTORY OF PRETERM LABOR IN SECOND TRIMESTER: ICD-10-CM

## 2017-05-23 DIAGNOSIS — Z34.92 NORMAL PREGNANCY IN SECOND TRIMESTER: Primary | ICD-10-CM

## 2017-05-23 DIAGNOSIS — R82.998 LEUKOCYTES IN URINE: ICD-10-CM

## 2017-05-23 LAB
BASOPHILS # BLD AUTO: 0.03 K/UL
BASOPHILS NFR BLD: 0.3 %
DIFFERENTIAL METHOD: ABNORMAL
EOSINOPHIL # BLD AUTO: 0.5 K/UL
EOSINOPHIL NFR BLD: 4.2 %
ERYTHROCYTE [DISTWIDTH] IN BLOOD BY AUTOMATED COUNT: 14.7 %
HCT VFR BLD AUTO: 34.9 %
HGB BLD-MCNC: 11.5 G/DL
LYMPHOCYTES # BLD AUTO: 1.8 K/UL
LYMPHOCYTES NFR BLD: 16.9 %
MCH RBC QN AUTO: 30 PG
MCHC RBC AUTO-ENTMCNC: 33 %
MCV RBC AUTO: 91 FL
MONOCYTES # BLD AUTO: 1 K/UL
MONOCYTES NFR BLD: 9.7 %
NEUTROPHILS # BLD AUTO: 7.2 K/UL
NEUTROPHILS NFR BLD: 67.1 %
PLATELET # BLD AUTO: 267 K/UL
PMV BLD AUTO: 10.4 FL
RBC # BLD AUTO: 3.83 M/UL
WBC # BLD AUTO: 10.74 K/UL

## 2017-05-23 PROCEDURE — 86703 HIV-1/HIV-2 1 RESULT ANTBDY: CPT

## 2017-05-23 PROCEDURE — 86592 SYPHILIS TEST NON-TREP QUAL: CPT

## 2017-05-23 PROCEDURE — 85025 COMPLETE CBC W/AUTO DIFF WBC: CPT

## 2017-05-23 PROCEDURE — 36415 COLL VENOUS BLD VENIPUNCTURE: CPT

## 2017-05-23 PROCEDURE — 99213 OFFICE O/P EST LOW 20 MIN: CPT | Mod: TH,S$PBB,, | Performed by: OBSTETRICS & GYNECOLOGY

## 2017-05-23 PROCEDURE — 99999 PR PBB SHADOW E&M-EST. PATIENT-LVL II: CPT | Mod: PBBFAC,,, | Performed by: OBSTETRICS & GYNECOLOGY

## 2017-05-23 NOTE — PROGRESS NOTES
Good fetal movement.  No contractions, no vaginal bleeding, and no loss of fluid.  Labs and GBS today.

## 2017-05-24 ENCOUNTER — CLINICAL SUPPORT (OUTPATIENT)
Dept: OBSTETRICS AND GYNECOLOGY | Facility: CLINIC | Age: 22
End: 2017-05-24
Payer: MEDICAID

## 2017-05-24 DIAGNOSIS — Z87.51 HISTORY OF PRETERM LABOR: Primary | ICD-10-CM

## 2017-05-24 LAB
BACTERIA UR CULT: NO GROWTH
HIV 1+2 AB+HIV1 P24 AG SERPL QL IA: NEGATIVE
RPR SER QL: NORMAL

## 2017-05-24 PROCEDURE — 99999 PR PBB SHADOW E&M-EST. PATIENT-LVL I: CPT | Mod: PBBFAC,,,

## 2017-05-24 PROCEDURE — 99211 OFF/OP EST MAY X REQ PHY/QHP: CPT | Mod: PBBFAC

## 2017-05-24 PROCEDURE — 96372 THER/PROPH/DIAG INJ SC/IM: CPT | Mod: PBBFAC

## 2017-05-24 RX ADMIN — HYDROXYPROGESTERONE CAPROATE 250 MG: 250 INJECTION INTRAMUSCULAR at 08:05

## 2017-05-26 LAB — BACTERIA SPEC AEROBE CULT: NORMAL

## 2017-05-30 ENCOUNTER — ROUTINE PRENATAL (OUTPATIENT)
Dept: OBSTETRICS AND GYNECOLOGY | Facility: CLINIC | Age: 22
End: 2017-05-30
Payer: MEDICAID

## 2017-05-30 VITALS — BODY MASS INDEX: 22.6 KG/M2 | DIASTOLIC BLOOD PRESSURE: 66 MMHG | WEIGHT: 140 LBS | SYSTOLIC BLOOD PRESSURE: 114 MMHG

## 2017-05-30 DIAGNOSIS — O09.212 HIGH RISK PREGNANCY DUE TO HISTORY OF PRETERM LABOR IN SECOND TRIMESTER: Primary | ICD-10-CM

## 2017-05-30 DIAGNOSIS — O09.292 HISTORY OF FETAL ANOMALY IN PRIOR PREGNANCY, CURRENTLY PREGNANT IN SECOND TRIMESTER: ICD-10-CM

## 2017-05-30 PROCEDURE — 99999 PR PBB SHADOW E&M-EST. PATIENT-LVL II: CPT | Mod: PBBFAC,,, | Performed by: OBSTETRICS & GYNECOLOGY

## 2017-05-30 PROCEDURE — 99212 OFFICE O/P EST SF 10 MIN: CPT | Mod: PBBFAC | Performed by: OBSTETRICS & GYNECOLOGY

## 2017-05-30 PROCEDURE — 99213 OFFICE O/P EST LOW 20 MIN: CPT | Mod: TH,S$PBB,, | Performed by: OBSTETRICS & GYNECOLOGY

## 2017-06-06 ENCOUNTER — TELEPHONE (OUTPATIENT)
Dept: OBSTETRICS AND GYNECOLOGY | Facility: CLINIC | Age: 22
End: 2017-06-06

## 2017-06-06 ENCOUNTER — ROUTINE PRENATAL (OUTPATIENT)
Dept: OBSTETRICS AND GYNECOLOGY | Facility: CLINIC | Age: 22
End: 2017-06-06
Payer: MEDICAID

## 2017-06-06 VITALS — WEIGHT: 140.63 LBS | BODY MASS INDEX: 22.7 KG/M2 | SYSTOLIC BLOOD PRESSURE: 110 MMHG | DIASTOLIC BLOOD PRESSURE: 60 MMHG

## 2017-06-06 DIAGNOSIS — O09.212 HIGH RISK PREGNANCY DUE TO HISTORY OF PRETERM LABOR IN SECOND TRIMESTER: Primary | ICD-10-CM

## 2017-06-06 DIAGNOSIS — Z98.891 HISTORY OF CESAREAN DELIVERY: ICD-10-CM

## 2017-06-06 PROCEDURE — 99213 OFFICE O/P EST LOW 20 MIN: CPT | Mod: TH,S$PBB,, | Performed by: OBSTETRICS & GYNECOLOGY

## 2017-06-06 PROCEDURE — 99999 PR PBB SHADOW E&M-EST. PATIENT-LVL II: CPT | Mod: PBBFAC,,, | Performed by: OBSTETRICS & GYNECOLOGY

## 2017-06-06 PROCEDURE — 99212 OFFICE O/P EST SF 10 MIN: CPT | Mod: PBBFAC | Performed by: OBSTETRICS & GYNECOLOGY

## 2017-06-06 NOTE — TELEPHONE ENCOUNTER
Spoke with  whom states she is actually doing a section at 1pm and will not be able to see her at the time. She can come in but just wait.     Called pt back, advised  advised me she would be doing a  at the time she requested. Pt advised since she was unable to make it in on this morning to keep appt as scheduled. Pt verbalized understanding

## 2017-06-06 NOTE — TELEPHONE ENCOUNTER
Spoke with  pt can come in sooner on today for concerns.     Called pt. Asked pt if she is able to stop by on this morning. Pt states do you think I need to come in now or can I just wait. Advised pt we were trying to get her seen sooner b/c she had some concerns with cramping. Asked pt again about what time she thinks she can make it in to the office. The pt states she can come in for 1 today. Voiced understanding.     Appt Notes updated!!

## 2017-06-06 NOTE — TELEPHONE ENCOUNTER
----- Message from Hannah Forrester sent at 6/6/2017  9:58 AM CDT -----  Contact: Patient  X _1st Request  _  2nd Request  _  3rd Request    Who:GAYLA PURI [[id number]]    Why:Patient states she is 38 weeks and been having cramping for 2 days she has a appointment on today but wants to see should she go to the emergency now     What Number to Call Back:[phone]    When to Expect a call back: (Before the end of the day)   -- if call after 3:00 call back will be tomorrow.

## 2017-06-16 ENCOUNTER — ANESTHESIA EVENT (OUTPATIENT)
Dept: OBSTETRICS AND GYNECOLOGY | Facility: OTHER | Age: 22
End: 2017-06-16
Payer: MEDICAID

## 2017-06-16 ENCOUNTER — ANESTHESIA (OUTPATIENT)
Dept: OBSTETRICS AND GYNECOLOGY | Facility: OTHER | Age: 22
End: 2017-06-16
Payer: MEDICAID

## 2017-06-16 ENCOUNTER — HOSPITAL ENCOUNTER (INPATIENT)
Facility: OTHER | Age: 22
LOS: 2 days | Discharge: HOME | End: 2017-06-18
Attending: OBSTETRICS & GYNECOLOGY | Admitting: OBSTETRICS & GYNECOLOGY
Payer: MEDICAID

## 2017-06-16 DIAGNOSIS — Z98.891 HISTORY OF CESAREAN DELIVERY: ICD-10-CM

## 2017-06-16 DIAGNOSIS — O34.219 VBAC, DELIVERED, CURRENT HOSPITALIZATION: ICD-10-CM

## 2017-06-16 DIAGNOSIS — O47.9 UTERINE CONTRACTIONS DURING PREGNANCY: ICD-10-CM

## 2017-06-16 DIAGNOSIS — Z3A.39 39 WEEKS GESTATION OF PREGNANCY: ICD-10-CM

## 2017-06-16 DIAGNOSIS — O34.219 HISTORY OF CESAREAN DELIVERY AFFECTING PREGNANCY: ICD-10-CM

## 2017-06-16 DIAGNOSIS — O34.219 VBAC, DELIVERED: ICD-10-CM

## 2017-06-16 LAB
ABO + RH BLD: NORMAL
ALLENS TEST: ABNORMAL
BASOPHILS # BLD AUTO: 0.02 K/UL
BASOPHILS NFR BLD: 0.2 %
BLD GP AB SCN CELLS X3 SERPL QL: NORMAL
DIFFERENTIAL METHOD: ABNORMAL
EOSINOPHIL # BLD AUTO: 0.4 K/UL
EOSINOPHIL NFR BLD: 4.1 %
ERYTHROCYTE [DISTWIDTH] IN BLOOD BY AUTOMATED COUNT: 15.4 %
HCO3 UR-SCNC: 23 MMOL/L (ref 24–28)
HCT VFR BLD AUTO: 36.9 %
HGB BLD-MCNC: 12.4 G/DL
LYMPHOCYTES # BLD AUTO: 1.3 K/UL
LYMPHOCYTES NFR BLD: 13.4 %
MCH RBC QN AUTO: 30.3 PG
MCHC RBC AUTO-ENTMCNC: 33.6 %
MCV RBC AUTO: 90 FL
MONOCYTES # BLD AUTO: 1 K/UL
MONOCYTES NFR BLD: 10.1 %
NEUTROPHILS # BLD AUTO: 7.1 K/UL
NEUTROPHILS NFR BLD: 71 %
PCO2 BLDA: 50.5 MMHG (ref 35–45)
PH SMN: 7.27 [PH] (ref 7.35–7.45)
PLATELET # BLD AUTO: 220 K/UL
PMV BLD AUTO: 11.4 FL
PO2 BLDA: 20 MMHG (ref 80–100)
POC BE: -4 MMOL/L
POC SATURATED O2: 24 % (ref 95–100)
RBC # BLD AUTO: 4.09 M/UL
SAMPLE: ABNORMAL
SITE: ABNORMAL
WBC # BLD AUTO: 10.03 K/UL

## 2017-06-16 PROCEDURE — 86900 BLOOD TYPING SEROLOGIC ABO: CPT

## 2017-06-16 PROCEDURE — 99285 EMERGENCY DEPT VISIT HI MDM: CPT | Mod: 25

## 2017-06-16 PROCEDURE — 63600175 PHARM REV CODE 636 W HCPCS: Performed by: STUDENT IN AN ORGANIZED HEALTH CARE EDUCATION/TRAINING PROGRAM

## 2017-06-16 PROCEDURE — 27800517 HC TRAY,EPIDURAL-CONTINUOUS: Performed by: STUDENT IN AN ORGANIZED HEALTH CARE EDUCATION/TRAINING PROGRAM

## 2017-06-16 PROCEDURE — 27200710 HC EPIDURAL INFUSION PUMP SET: Performed by: STUDENT IN AN ORGANIZED HEALTH CARE EDUCATION/TRAINING PROGRAM

## 2017-06-16 PROCEDURE — 59025 FETAL NON-STRESS TEST: CPT | Mod: 26,,, | Performed by: OBSTETRICS & GYNECOLOGY

## 2017-06-16 PROCEDURE — 59409 OBSTETRICAL CARE: CPT | Mod: AA,,, | Performed by: ANESTHESIOLOGY

## 2017-06-16 PROCEDURE — 36416 COLLJ CAPILLARY BLOOD SPEC: CPT

## 2017-06-16 PROCEDURE — 11000001 HC ACUTE MED/SURG PRIVATE ROOM

## 2017-06-16 PROCEDURE — 72200005 HC VAGINAL DELIVERY LEVEL II

## 2017-06-16 PROCEDURE — 10907ZC DRAINAGE OF AMNIOTIC FLUID, THERAPEUTIC FROM PRODUCTS OF CONCEPTION, VIA NATURAL OR ARTIFICIAL OPENING: ICD-10-PCS | Performed by: OBSTETRICS & GYNECOLOGY

## 2017-06-16 PROCEDURE — 86850 RBC ANTIBODY SCREEN: CPT

## 2017-06-16 PROCEDURE — 59025 FETAL NON-STRESS TEST: CPT

## 2017-06-16 PROCEDURE — 99283 EMERGENCY DEPT VISIT LOW MDM: CPT | Mod: 25,,, | Performed by: OBSTETRICS & GYNECOLOGY

## 2017-06-16 PROCEDURE — 62326 NJX INTERLAMINAR LMBR/SAC: CPT | Performed by: ANESTHESIOLOGY

## 2017-06-16 PROCEDURE — 85025 COMPLETE CBC W/AUTO DIFF WBC: CPT

## 2017-06-16 PROCEDURE — 25000003 PHARM REV CODE 250: Performed by: STUDENT IN AN ORGANIZED HEALTH CARE EDUCATION/TRAINING PROGRAM

## 2017-06-16 PROCEDURE — 82803 BLOOD GASES ANY COMBINATION: CPT

## 2017-06-16 PROCEDURE — 25000003 PHARM REV CODE 250: Performed by: OBSTETRICS & GYNECOLOGY

## 2017-06-16 PROCEDURE — 51702 INSERT TEMP BLADDER CATH: CPT

## 2017-06-16 PROCEDURE — 72100002 HC LABOR CARE, 1ST 8 HOURS

## 2017-06-16 PROCEDURE — 99900035 HC TECH TIME PER 15 MIN (STAT)

## 2017-06-16 RX ORDER — OXYTOCIN/RINGER'S LACTATE 20/1000 ML
2 PLASTIC BAG, INJECTION (ML) INTRAVENOUS CONTINUOUS
Status: DISCONTINUED | OUTPATIENT
Start: 2017-06-16 | End: 2017-06-16

## 2017-06-16 RX ORDER — ACETAMINOPHEN 325 MG/1
650 TABLET ORAL EVERY 6 HOURS PRN
Status: DISCONTINUED | OUTPATIENT
Start: 2017-06-16 | End: 2017-06-18 | Stop reason: HOSPADM

## 2017-06-16 RX ORDER — SODIUM CHLORIDE, SODIUM LACTATE, POTASSIUM CHLORIDE, CALCIUM CHLORIDE 600; 310; 30; 20 MG/100ML; MG/100ML; MG/100ML; MG/100ML
INJECTION, SOLUTION INTRAVENOUS CONTINUOUS
Status: DISCONTINUED | OUTPATIENT
Start: 2017-06-16 | End: 2017-06-16

## 2017-06-16 RX ORDER — DIPHENHYDRAMINE HCL 25 MG
25 CAPSULE ORAL EVERY 4 HOURS PRN
Status: DISCONTINUED | OUTPATIENT
Start: 2017-06-16 | End: 2017-06-18 | Stop reason: HOSPADM

## 2017-06-16 RX ORDER — DIPHENHYDRAMINE HYDROCHLORIDE 50 MG/ML
25 INJECTION INTRAMUSCULAR; INTRAVENOUS EVERY 4 HOURS PRN
Status: DISCONTINUED | OUTPATIENT
Start: 2017-06-16 | End: 2017-06-18 | Stop reason: HOSPADM

## 2017-06-16 RX ORDER — OXYCODONE AND ACETAMINOPHEN 5; 325 MG/1; MG/1
1 TABLET ORAL EVERY 4 HOURS PRN
Status: DISCONTINUED | OUTPATIENT
Start: 2017-06-16 | End: 2017-06-18 | Stop reason: HOSPADM

## 2017-06-16 RX ORDER — FENTANYL CITRATE 50 UG/ML
INJECTION, SOLUTION INTRAMUSCULAR; INTRAVENOUS
Status: DISPENSED
Start: 2017-06-16 | End: 2017-06-17

## 2017-06-16 RX ORDER — METOCLOPRAMIDE HYDROCHLORIDE 5 MG/ML
10 INJECTION INTRAMUSCULAR; INTRAVENOUS ONCE
Status: DISCONTINUED | OUTPATIENT
Start: 2017-06-16 | End: 2017-06-16

## 2017-06-16 RX ORDER — BUPIVACAINE HYDROCHLORIDE 2.5 MG/ML
INJECTION, SOLUTION INFILTRATION; PERINEURAL
Status: DISCONTINUED | OUTPATIENT
Start: 2017-06-16 | End: 2017-06-16

## 2017-06-16 RX ORDER — SODIUM CITRATE AND CITRIC ACID MONOHYDRATE 334; 500 MG/5ML; MG/5ML
30 SOLUTION ORAL ONCE
Status: DISCONTINUED | OUTPATIENT
Start: 2017-06-16 | End: 2017-06-16

## 2017-06-16 RX ORDER — FENTANYL/BUPIVACAINE/NS/PF 2MCG/ML-.1
PLASTIC BAG, INJECTION (ML) INJECTION CONTINUOUS PRN
Status: DISCONTINUED | OUTPATIENT
Start: 2017-06-16 | End: 2017-06-16

## 2017-06-16 RX ORDER — BUPIVACAINE HYDROCHLORIDE 2.5 MG/ML
INJECTION, SOLUTION EPIDURAL; INFILTRATION; INTRACAUDAL
Status: DISPENSED
Start: 2017-06-16 | End: 2017-06-17

## 2017-06-16 RX ORDER — FAMOTIDINE 10 MG/ML
20 INJECTION INTRAVENOUS ONCE
Status: DISCONTINUED | OUTPATIENT
Start: 2017-06-16 | End: 2017-06-16

## 2017-06-16 RX ORDER — FENTANYL CITRATE 50 UG/ML
INJECTION, SOLUTION INTRAMUSCULAR; INTRAVENOUS
Status: DISCONTINUED | OUTPATIENT
Start: 2017-06-16 | End: 2017-06-16

## 2017-06-16 RX ORDER — MISOPROSTOL 200 UG/1
600 TABLET ORAL
Status: DISCONTINUED | OUTPATIENT
Start: 2017-06-16 | End: 2017-06-16

## 2017-06-16 RX ORDER — OXYTOCIN/RINGER'S LACTATE 20/1000 ML
41.65 PLASTIC BAG, INJECTION (ML) INTRAVENOUS CONTINUOUS
Status: ACTIVE | OUTPATIENT
Start: 2017-06-16 | End: 2017-06-17

## 2017-06-16 RX ORDER — LIDOCAINE HYDROCHLORIDE AND EPINEPHRINE 15; 5 MG/ML; UG/ML
INJECTION, SOLUTION EPIDURAL
Status: DISCONTINUED | OUTPATIENT
Start: 2017-06-16 | End: 2017-06-16

## 2017-06-16 RX ORDER — DOCUSATE SODIUM 100 MG/1
200 CAPSULE, LIQUID FILLED ORAL 2 TIMES DAILY PRN
Status: DISCONTINUED | OUTPATIENT
Start: 2017-06-16 | End: 2017-06-18 | Stop reason: HOSPADM

## 2017-06-16 RX ORDER — IBUPROFEN 600 MG/1
600 TABLET ORAL EVERY 6 HOURS PRN
Status: DISCONTINUED | OUTPATIENT
Start: 2017-06-16 | End: 2017-06-18 | Stop reason: HOSPADM

## 2017-06-16 RX ORDER — FENTANYL CITRATE 50 UG/ML
INJECTION, SOLUTION INTRAMUSCULAR; INTRAVENOUS
Status: DISPENSED
Start: 2017-06-16 | End: 2017-06-16

## 2017-06-16 RX ORDER — FENTANYL/BUPIVACAINE/NS/PF 2MCG/ML-.1
PLASTIC BAG, INJECTION (ML) INJECTION
Status: DISPENSED
Start: 2017-06-16 | End: 2017-06-16

## 2017-06-16 RX ORDER — BUPIVACAINE HYDROCHLORIDE 2.5 MG/ML
INJECTION, SOLUTION EPIDURAL; INFILTRATION; INTRACAUDAL
Status: DISPENSED
Start: 2017-06-16 | End: 2017-06-16

## 2017-06-16 RX ORDER — FENTANYL/BUPIVACAINE/NS/PF 2MCG/ML-.1
PLASTIC BAG, INJECTION (ML) INJECTION CONTINUOUS
Status: DISCONTINUED | OUTPATIENT
Start: 2017-06-16 | End: 2017-06-16

## 2017-06-16 RX ORDER — ONDANSETRON 8 MG/1
8 TABLET, ORALLY DISINTEGRATING ORAL EVERY 8 HOURS PRN
Status: DISCONTINUED | OUTPATIENT
Start: 2017-06-16 | End: 2017-06-18 | Stop reason: HOSPADM

## 2017-06-16 RX ORDER — OXYCODONE AND ACETAMINOPHEN 10; 325 MG/1; MG/1
1 TABLET ORAL EVERY 4 HOURS PRN
Status: DISCONTINUED | OUTPATIENT
Start: 2017-06-16 | End: 2017-06-18 | Stop reason: HOSPADM

## 2017-06-16 RX ORDER — ONDANSETRON 8 MG/1
8 TABLET, ORALLY DISINTEGRATING ORAL EVERY 8 HOURS PRN
Status: DISCONTINUED | OUTPATIENT
Start: 2017-06-16 | End: 2017-06-16

## 2017-06-16 RX ADMIN — SODIUM CHLORIDE, SODIUM LACTATE, POTASSIUM CHLORIDE, AND CALCIUM CHLORIDE: .6; .31; .03; .02 INJECTION, SOLUTION INTRAVENOUS at 08:06

## 2017-06-16 RX ADMIN — Medication 2 MILLI-UNITS/MIN: at 01:06

## 2017-06-16 RX ADMIN — BUPIVACAINE HYDROCHLORIDE 5 ML: 2.5 INJECTION, SOLUTION INFILTRATION; PERINEURAL at 02:06

## 2017-06-16 RX ADMIN — FENTANYL CITRATE 100 MCG: 50 INJECTION, SOLUTION INTRAMUSCULAR; INTRAVENOUS at 02:06

## 2017-06-16 RX ADMIN — Medication 10 ML: at 09:06

## 2017-06-16 RX ADMIN — SODIUM CHLORIDE, SODIUM LACTATE, POTASSIUM CHLORIDE, AND CALCIUM CHLORIDE 1000 ML: .6; .31; .03; .02 INJECTION, SOLUTION INTRAVENOUS at 08:06

## 2017-06-16 RX ADMIN — Medication 10 ML/HR: at 09:06

## 2017-06-16 RX ADMIN — FENTANYL CITRATE 100 MCG: 50 INJECTION, SOLUTION INTRAMUSCULAR; INTRAVENOUS at 09:06

## 2017-06-16 RX ADMIN — LIDOCAINE HYDROCHLORIDE AND EPINEPHRINE 3 ML: 15; 5 INJECTION, SOLUTION EPIDURAL at 09:06

## 2017-06-16 NOTE — PROGRESS NOTES
LABOR PROGRESS NOTE    S:  MD to bedside for cervical exam. Complaints: No. Epidural in place, comfortable.    O: Temp:  [97.5 °F (36.4 °C)] 97.5 °F (36.4 °C)  Pulse:  [] 62  Resp:  [18] 18  SpO2:  [97 %-100 %] 98 %  BP: (109-138)/(62-95) 110/70      FHT: 120 BPM/mod beat to beat variability/+accels/no decels Cat 1 (reassuring)  CTX: q 4 min  SVE: 4/80/-2    AROM this check, fetal vertex well engaged.  IUPC placed after noting posterior/fundal location of placenta    ASSESSMENT:   22 y.o.  at 39w2d, TOLAC    FHT reassuring    Active Hospital Problems    Diagnosis  POA    *History of  delivery affecting pregnancy [O34.219]  Yes    Indication for care in labor or delivery [O75.9]  Unknown      Resolved Hospital Problems    Diagnosis Date Resolved POA   No resolved problems to display.         PLAN:    Labor management  Continue Close Maternal/Fetal Monitoring.   S/p AROM this check, IUPC placed  Epidural per anesthesia  Recheck 2 hours or PRN    Torri Navarro MD

## 2017-06-16 NOTE — PROGRESS NOTES
"LABOR NOTE    S:  Complaints: No.  Epidural working:  yes      O: /72   Pulse 83   Temp 97.5 °F (36.4 °C) (Temporal)   Resp 18   Ht 5' 6" (1.676 m)   Wt 63.5 kg (140 lb)   LMP 2016   SpO2 100%   Breastfeeding? No   BMI 22.60 kg/m²       FHT: reactive Cat 1 (reassuring)  125bpm. + Accels, no decels, mod variability  CTX: q 2 minutes  SVE: 10/100/+1      ASSESSMENT:   22 y.o.  at 39w2d, TOLAC    FHT reassuring    Active Hospital Problems    Diagnosis  POA    *History of  delivery affecting pregnancy [O34.219]  Yes    Indication for care in labor or delivery [O75.9]  Unknown      Resolved Hospital Problems    Diagnosis Date Resolved POA   No resolved problems to display.         PLAN:    Continue Close Maternal/Fetal Monitoring  Pitocin Augmentation per protocol  Plan to set up to start pushing.  Prepare for     Em Armas MD  PGY-3 OB/GYN  [phone]      "

## 2017-06-16 NOTE — PROGRESS NOTES
LABOR PROGRESS NOTE    S:  MD to bedside for cervical exam. Complaints: No. Epidural in place, comfortable.    O: Temp:  [97.5 °F (36.4 °C)] 97.5 °F (36.4 °C)  Pulse:  [] 75  Resp:  [18] (P) 18  SpO2:  [97 %-100 %] 100 %  BP: (109-138)/(61-95) 110/61    FHT: 120 BPM/mod beat to beat variability/+accels/no decels Cat 1 (reassuring)  CTX: q 4 min  SVE: 5/80/-2; per Dr. Sherwood     ASSESSMENT:   22 y.o.  at 39w2d, TOLAC    FHT reassuring    Active Hospital Problems    Diagnosis  POA    *History of  delivery affecting pregnancy [O34.219]  Yes    Indication for care in labor or delivery [O75.9]  Unknown      Resolved Hospital Problems    Diagnosis Date Resolved POA   No resolved problems to display.       PLAN:    Labor management  Continue Close Maternal/Fetal Monitoring.   Augmentation - s/p AROM. IUPC in place. Start pitocin.   Epidural per anesthesia  Recheck 2 hours or PRN    Katya Proctor MD

## 2017-06-16 NOTE — HPI
Madyson Melendrez is a 22 y.o. [id number] at 39w2d presents complaining of contractions q5-10 minutes.   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  Patient reports contractions, denies vaginal bleeding, denies LOF.   Fetal Movement: normal.

## 2017-06-16 NOTE — H&P
[facility]  Obstetrics  History & Physical    Patient Name: Madyson Melendrez  MRN: [id number]  Admission Date: 2017  Primary Care Provider: Primary Doctor No    Subjective:     Principal Problem:History of  delivery affecting pregnancy    History of Present Illness:  Madyson Melendrez is a 22 y.o. [id number] at 39w2d presents complaining of contractions q5-10 minutes.   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  Patient reports contractions, denies vaginal bleeding, denies LOF.   Fetal Movement: normal.      Obstetric History       T0      L0     SAB0   TAB0   Ectopic0   Multiple0   Live Births1       # Outcome Date GA Lbr Carter/2nd Weight Sex Delivery Anes PTL Lv   2 Current            1  09/10/13 33w0d   F   N ND      Complications: Gastroschisis,Breech presentation        No past medical history on file.  Past Surgical History:   Procedure Laterality Date     SECTION      gastroschisis       Facility-Administered Medications Prior to Admission   Medication    SAWYER Oil 250 mg     PTA Medications   Medication Sig    ferrous sulfate 325 mg (65 mg iron) Tab tablet Take 1 tablet (325 mg total) by mouth once daily.    SAWYER 250 mg/mL (1 mL) Oil     PNV with Ca no.36-iron-FA (ALLAN PRENATAL) 13.5-0.4 mg Cap Take 1 tablet by mouth once daily.       Review of patient's allergies indicates:   Allergen Reactions    Penicillins      Allergy testing at young age; no reaction        Family History     None        Social History Main Topics    Smoking status: Never Smoker    Smokeless tobacco: Not on file    Alcohol use No    Drug use: No    Sexual activity: Yes     Partners: Male     Birth control/ protection: None     Review of Systems   Constitutional: Negative for appetite change, chills, fatigue and fever.   Respiratory: Negative for cough and shortness of breath.    Cardiovascular:  Negative for chest pain and leg swelling.   Gastrointestinal: Negative for abdominal pain, bloating, constipation, diarrhea, nausea and vomiting.   Genitourinary: Negative for dysuria, hematuria, pelvic pain, vaginal discharge and vaginal pain.   Breast: Negative for breast mass, nipple discharge and skin changes     Objective:     Vital Signs (Most Recent):  Temp: 97.5 °F (36.4 °C) (17)  Pulse: 86 (17)  Resp: 18 (17)  BP: 112/74 (17)  SpO2: 100 % (17) Vital Signs (24h Range):  Temp:  [97.5 °F (36.4 °C)] 97.5 °F (36.4 °C)  Pulse:  [85-86] 86  Resp:  [18] 18  SpO2:  [100 %] 100 %  BP: (112)/(74) 112/74        There is no height or weight on file to calculate BMI.    FHT: 125Cat 1 (reassuring)  TOCO:  Q 6 minutes    Physical Exam:   Constitutional: She is oriented to person, place, and time. She appears well-developed and well-nourished. No distress.    HENT:   Head: Normocephalic and atraumatic.      Cardiovascular: Normal rate and regular rhythm.     Pulmonary/Chest: Effort normal.        Abdominal: Soft. Bowel sounds are normal. She exhibits no distension. There is no tenderness. There is no rebound and no guarding.                 Neurological: She is alert and oriented to person, place, and time.    Skin: Skin is warm and dry.    Psychiatric: She has a normal mood and affect.       Cervix:  Dilation:  2.5 cm  Effacement:  70%  Station: -3  Presentation: Vertex     Significant Labs:  Lab Results   Component Value Date    GROUPTRH A POS 2016    HEPBSAG Negative 2016    STREPBCULT No Group B Streptococcus isolated 2017       I have personallly reviewed all pertinent lab results from the last 24 hours.    Assessment/Plan:     22 y.o. female  at 39w2d for:    * History of  delivery affecting pregnancy    - Admit to L&D  - ,  delivery  - Risks, benefits, alternatives, and possible complications have been discussed with  patient  - Pain management per anesthesia  - CBC, T&S  - GBS neg, Rh pos  - Notify staff  - Plan for expectant management followed by AROM, pitocin if needed for augmentation                Torri Navarro MD  Obstetrics  [facility]

## 2017-06-16 NOTE — ANESTHESIA PROCEDURE NOTES
Epidural    Patient location during procedure: OB   Reason for block: primary anesthetic   Diagnosis: IUP   Start time: 6/16/2017 9:26 AM  Timeout: 6/16/2017 8:25 AM  End time: 6/16/2017 9:33 AM  Surgery related to: Vaginal Delivery  Staffing  Anesthesiologist: KIRA HAZEL  Resident/CRNA: STELLA ORTIZ  Performed: resident/CRNA   Preanesthetic Checklist  Completed: patient identified, site marked, surgical consent, pre-op evaluation, timeout performed, IV checked, risks and benefits discussed, monitors and equipment checked, anesthesia consent given, hand hygiene performed and patient being monitored  Preparation  Patient position: sitting  Prep: ChloraPrep  Patient monitoring: Pulse Ox  Epidural  Skin Anesthetic: lidocaine 1%  Skin Wheal: 3 mL  Administration type: continuous  Approach: midline  Interspace: L3-4  Injection technique: EROS saline  Needle and Epidural Catheter  Needle type: Tuohy   Needle gauge: 17  Needle length: 3.5 inches  Needle insertion depth: 5 cm  Catheter type: springwound and multi-orifice  Catheter size: 19 G  Catheter at skin depth: 9 cm  Test dose: 3 mL of lidocaine 1.5% with Epi 1-to-200,000  Additional Documentation: incremental injection, negative aspiration for heme and CSF, no paresthesia on injection, no signs/symptoms of IV or SA injection, no significant pain on injection and no significant complaints from patient  Needle localization: anatomical landmarks  Medications:  Bolus administered: 10 mL of 0.125% bupivacaine  Epinephrine added: none  Opioid administered: 100 mcg of   fentanyl  Volume per aspiration: 5 mL  Time between aspirations: 5 minutes  Assessment  Ease of block: easy  Patient's tolerance of the procedure: comfortable throughout block and no complaints

## 2017-06-16 NOTE — ED PROVIDER NOTES
Encounter Date: 2017       History     Chief Complaint   Patient presents with    Contractions     Review of patient's allergies indicates:   Allergen Reactions    Penicillins      Allergy testing at young age; no reaction     Madyson Melendrez is a 22 y.o. [id number] at 39w2d presents complaining of contractions q5-10 minutes.   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  Patient reports contractions, denies vaginal bleeding, denies LOF.   Fetal Movement: normal.            No past medical history on file.  Past Surgical History:   Procedure Laterality Date     SECTION      gastroschisis     Family History   Problem Relation Age of Onset    Breast cancer Neg Hx     Colon cancer Neg Hx     Ovarian cancer Neg Hx      Social History   Substance Use Topics    Smoking status: Never Smoker    Smokeless tobacco: Not on file    Alcohol use No     Review of Systems   Constitutional: Negative for fever.   HENT: Negative for sore throat.    Respiratory: Negative for shortness of breath.    Cardiovascular: Negative for chest pain.   Gastrointestinal: Negative for nausea.   Genitourinary: Negative for dysuria.   Musculoskeletal: Negative for back pain.   Skin: Negative for rash.   Neurological: Negative for weakness.   Hematological: Does not bruise/bleed easily.       Physical Exam     Initial Vitals   BP Pulse Resp Temp SpO2   17 0649 17 0648 17 0649 17 0649 17 0649   112/74 85 18 97.5 °F (36.4 °C) 100 %     Physical Exam    Nursing note and vitals reviewed.  Constitutional: She appears well-developed and well-nourished. She is not diaphoretic. No distress.   HENT:   Head: Normocephalic and atraumatic.   Eyes: Conjunctivae and EOM are normal.   Cardiovascular: Normal rate.   Pulmonary/Chest: No respiratory distress.   Abdominal: Soft. There is no tenderness. There is no rebound and no guarding.    Musculoskeletal: Normal range of motion.   Neurological: She is alert and oriented to person, place, and time.   Skin: Skin is warm and dry.   Psychiatric: She has a normal mood and affect. Her behavior is normal. Judgment and thought content normal.     OB LABOR EXAM:   Pre-Term Labor: No.   Membranes ruptured: No.   Method: Sterile vaginal exam per MD.   Vaginal Bleeding: none present.   Engagement: ballotable/floating.   Dilatation: 2.   Station: -3.   Amniotic Fluid Color: no fluid.     Comments: NST Interpretation:   125 BPM baseline  Variability: moderate  Accelerations: present  Decelerations: variable  Contractions: q6 minutes    Clinical Impression: Reactive Non-Stress Test         ED Course   Procedures  Labs Reviewed - No data to display                       Attending Attestation:   Physician Attestation Statement for Resident:  As the supervising MD   Physician Attestation Statement: I have personally seen and examined this patient.   I agree with the above history. -:   As the supervising MD I agree with the above treatment, course, plan, and disposition.  I was personally present during the critical portions of the procedure(s) performed by the resident and was immediately available in the ED to provide services and assistance as needed during the entire procedure.  I have reviewed and agree with the residents interpretation of the following: rhythm strips.  I have reviewed the following: old records at this facility.                    ED Course   Comment By Time   Ms. Melendrez evaluated.  She presented for contraction pain.  She was found to be 2.5cm.  She has had previous C/S, but desires .  Will re-evaluate in 2 hours.  Declines pain meds at this time Clarisa Low DO  [id number]     Clinical Impression:   The primary encounter diagnosis was Indication for care in labor and delivery, antepartum. Diagnoses of History of  delivery affecting pregnancy, 39 weeks gestation of pregnancy,  Uterine contractions during pregnancy, and History of  delivery were also pertinent to this visit.    Larissa regularly in setting of previous LTCS. Will admit for  TOLAC.         Torri Lyons MD  Resident  [phone]       Clarisa Low DO  [phone]

## 2017-06-16 NOTE — HOSPITAL COURSE
17- admitted for TOLAC.  Underwent successful .  No lacerations  17- PPD#1 s/p . Patient is doing well this morning. She denies nausea, vomiting, fever or chills.  Patient reports mild abdominal pain that is well relieved by oral pain medications. Lochia is mild to moderate  and decreasing. Patient is voiding without difficulty and ambulating with no difficulty. She has passed flatus, and has not had BM.  Patient does plan to breast feed.  2017 - PPD 2 s/p . Meeting all milestones, no complaints. Stable for discharge.

## 2017-06-16 NOTE — SUBJECTIVE & OBJECTIVE
Obstetric History       T0      L0     SAB0   TAB0   Ectopic0   Multiple0   Live Births1       # Outcome Date GA Lbr Carter/2nd Weight Sex Delivery Anes PTL Lv   2 Current            1  09/10/13 33w0d   F   N ND      Complications: Gastroschisis,Breech presentation        No past medical history on file.  Past Surgical History:   Procedure Laterality Date     SECTION      gastroschisis       Facility-Administered Medications Prior to Admission   Medication    SAWYER Oil 250 mg     PTA Medications   Medication Sig    ferrous sulfate 325 mg (65 mg iron) Tab tablet Take 1 tablet (325 mg total) by mouth once daily.    SAWYER 250 mg/mL (1 mL) Oil     PNV with Ca no.36-iron-FA (ALLAN PRENATAL) 13.5-0.4 mg Cap Take 1 tablet by mouth once daily.       Review of patient's allergies indicates:   Allergen Reactions    Penicillins      Allergy testing at young age; no reaction        Family History     None        Social History Main Topics    Smoking status: Never Smoker    Smokeless tobacco: Not on file    Alcohol use No    Drug use: No    Sexual activity: Yes     Partners: Male     Birth control/ protection: None     Review of Systems   Constitutional: Negative for appetite change, chills, fatigue and fever.   Respiratory: Negative for cough and shortness of breath.    Cardiovascular: Negative for chest pain and leg swelling.   Gastrointestinal: Negative for abdominal pain, bloating, constipation, diarrhea, nausea and vomiting.   Genitourinary: Negative for dysuria, hematuria, pelvic pain, vaginal discharge and vaginal pain.   Breast: Negative for breast mass, nipple discharge and skin changes     Objective:     Vital Signs (Most Recent):  Temp: 97.5 °F (36.4 °C) (17)  Pulse: 86 (17 0649)  Resp: 18 (17)  BP: 112/74 (17 0649)  SpO2: 100 % (17) Vital Signs (24h Range):  Temp:  [97.5 °F (36.4 °C)] 97.5 °F (36.4 °C)  Pulse:  [85-86] 86  Resp:   [18] 18  SpO2:  [100 %] 100 %  BP: (112)/(74) 112/74        There is no height or weight on file to calculate BMI.    FHT: 125Cat 1 (reassuring)  TOCO:  Q 6 minutes    Physical Exam:   Constitutional: She is oriented to person, place, and time. She appears well-developed and well-nourished. No distress.    HENT:   Head: Normocephalic and atraumatic.      Cardiovascular: Normal rate and regular rhythm.     Pulmonary/Chest: Effort normal.        Abdominal: Soft. Bowel sounds are normal. She exhibits no distension. There is no tenderness. There is no rebound and no guarding.                 Neurological: She is alert and oriented to person, place, and time.    Skin: Skin is warm and dry.    Psychiatric: She has a normal mood and affect.       Cervix:  Dilation:  2.5 cm  Effacement:  70%  Station: -3  Presentation: Vertex     Significant Labs:  Lab Results   Component Value Date    GROUPTRH A POS 11/28/2016    HEPBSAG Negative 11/28/2016    STREPBCULT No Group B Streptococcus isolated 05/23/2017       I have personallly reviewed all pertinent lab results from the last 24 hours.

## 2017-06-16 NOTE — ASSESSMENT & PLAN NOTE
- Admit to L&D  - ,  delivery  - Risks, benefits, alternatives, and possible complications have been discussed with patient  - Pain management per anesthesia  - CBC, T&S  - GBS neg, Rh pos  - Notify staff  - Plan for expectant management followed by AROM, pitocin if needed for augmentation

## 2017-06-16 NOTE — L&D DELIVERY NOTE
cephalic after approximately 10 minutes of maternal pushing.  Under epidural anesthesia.  Infant delivered OA over intact perineum.  No nuchal cord noted.  Female infant also tolerated the delivery well and was placed on mothers abdomen for skin to skin and bulb suctioning performed.   Cord clamped and cut after 1 minute of life.  Infant cried initially; however, then became less responsive and was brought to warmer. NICU team call for resuscitation.  Umbilical arterial gas and venous blood obtained.  Left periurethral abrasion found to be hemostatic, not requiring repair.  Placenta delivered spontaneously and IV pitocin given.  Uterine scar palpated internally, intact with any defects noted.  Uterine tone noted. No cervical, vaginal, or perineal lacerations.  Patient tolerated delivery well.   cc  Staff present for entire procedure.  S/L/N counts correct x2.    Lisa Bliss MD  OBGYN - PGY 2      Delivery Information for  Raoul Melendrez    Birth information:  YOB: 2019   Time of birth: 4:25 PM   Sex: female   Head Delivery Date/Time: 2017  4:25 PM   Delivery type: Vaginal, Spontaneous Delivery   Gestational Age: 39w2d    Delivery Providers    Delivering clinician:  Maya Sherwood MD   Other personnel:   Provider Role   Ericka Leavitt, YNES Mccoy, YNSE Cantu, YNES Bliss MD                   [place] Measurements    Weight:  3370 g Length:  52.1 cm   Head circum.:  34.3 cm Chest circum.:  33 cm          [place] Assessment    Living status:  Living   Apgars:      1 Minute:   5 Minute:   10 Minute:   15 Minute:   20 Minute:     Skin Color:   1  0  1      Heart Rate:   2  2  2      Reflex Irritability:   1  1  2      Muscle Tone:   1  1  1      Respiratory Effort:   2  0  2      Total:   7  4  8                 Apgars Assigned By:  ARIANA LORENZANA          Assisted Delivery Details:    Forceps attempted?:  No   Vacuum extractor attempted?:  No              Shoulder Dystocia    Shoulder dystocia present?:  No            Presentation and Position    Presentation:   Vertex   Position:                      Interventions/Resuscitation    Method:  Tactile Stimulation, PPV, Chest Compressions, NICU Attended        Cord    Vessels:  3 vessels   Complications:  None   Delayed Cord Clamping?:  No   Cord Clamped Date/Time:  2017  2:25 PM   Cord Blood Disposition:  Sent with Baby   Gases Sent?:  Yes        Placenta    Date and time:  2017  2:30 PM   Removal:  Spontaneous   Appearance:  Intact   Placenta disposition:  discarded            Labor Events:       labor: No     Labor Onset Date/Time:         Dilation Complete Date/Time:         Start Pushing Date/Time: 2017 16:14     Rupture Date/Time:              Rupture type:           Fluid Amount:        Fluid Color:        Fluid Odor:        Membrane Status (PeriCalm): ARM (Artificial Rupture)      Rupture Date/Time (PeriCalm): 2017 11:10:00      Fluid Amount (PeriCalm): Small      Fluid Color (PeriCalm): Clear       steroids: None     Antibiotics given for GBS: No     Induction:       Indications for induction:        Augmentation: amniotomy;oxytocin     Indications for augmentation:       Labor complications: None     Additional complications:          Cervical ripening:                     Delivery:      Episiotomy: None     Indication for Episiotomy:       Perineal Lacerations:   Repaired:      Periurethral Laceration: left Repaired: No   Labial Laceration: none Repaired:     Sulcus Laceration: none Repaired:     Vaginal Laceration: No Repaired:     Cervical Laceration: No Repaired:     Repair suture:       Repair # of packets:       Vaginal delivery QBL (mL): 100      QBL (mL): 0     Combined Blood Loss (mL): 0     Vaginal Sweep Performed: Yes     Surgicount Correct:         Other providers:       Anesthesia    Method:  Epidural              Details (if  applicable):  Trial of Labor      Categorization:      Priority:     Indications for :     Incision Type:       Additional  information:  Forceps:    Vacuum:    Breech:    Observed anomalies    Other (Comments): nicu called at 4min, baby brought to warmer, heart rate 40, no tone, pale, no resp effort. Chest comp and ppv started at 1629. nicu arrived at 1630 (5min). NIcu assumed care.

## 2017-06-16 NOTE — PROGRESS NOTES
LABOR PROGRESS NOTE    S:  MD to bedside for cervical exam. Complaints: Yes - uncomfortable with contractions, ready for epidural.      O: Temp:  [97.5 °F (36.4 °C)] 97.5 °F (36.4 °C)  Pulse:  [] 83  Resp:  [18] 18  SpO2:  [97 %-100 %] 98 %  BP: (112-122)/(71-74) 122/71      FHT: 120 BPM/mod beat to beat variability/+accels/occ variable decels Cat 2 (overall very reassuring)  CTX: not picking up, but persistent per patient (toco adjusted)  SVE: 380/-2        ASSESSMENT:   22 y.o.  at 39w2d, TOLAC    FHT reassuring    Active Hospital Problems    Diagnosis  POA    *History of  delivery affecting pregnancy [O34.219]  Yes    Indication for care in labor or delivery [O75.9]  Unknown      Resolved Hospital Problems    Diagnosis Date Resolved POA   No resolved problems to display.         PLAN:    Labor management  Continue Close Maternal/Fetal Monitoring.   Continue expectant management at this time  Epidural per anesthesia  Recheck 2 hours or PRN    Torri Navarro MD

## 2017-06-16 NOTE — ANESTHESIA PREPROCEDURE EVALUATION
2017  Madyson Melendrez is a 22 y.o., female     Madyson Melendrez is a 22 y.o. female [id number] at 39w2d presents complaining of contractions q5-10 minutes. 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 Patient determined to safe for TOLAC.     OB History    Para Term  AB Living   2 1  1     SAB TAB Ectopic Multiple Live Births       1      # Outcome Date GA Lbr Carter/2nd Weight Sex Delivery Anes PTL Lv   2 Current            1  09/10/13 33w0d   F   N ND      Complications: Gastroschisis,Breech presentation      Birth Comments: Infant  after gastroschisis surgery; normal amniocentesis;  labor leading to CS secondary to breech presentation;           Wt Readings from Last 1 Encounters:   17 1523 63.8 kg (140 lb 10.5 oz)       BP Readings from Last 3 Encounters:   17 112/74   17 110/60   17 114/66       Patient Active Problem List   Diagnosis    History of fetus with gastroschisis    High risk pregnancy due to history of  labor in second trimester    History of fetal anomaly in prior pregnancy, currently pregnant in second trimester    Hx of CS at 33 weeks- breech position, LTCS, safe for     History of  delivery affecting pregnancy    Indication for care in labor or delivery       Past Surgical History:   Procedure Laterality Date     SECTION      gastroschisis       Social History     Social History    Marital status: Single     Spouse name: N/A    Number of children: N/A    Years of education: N/A     Occupational History    Not on file.     Social History Main Topics    Smoking status: Never Smoker    Smokeless tobacco: Not on file    Alcohol use No    Drug use: No    Sexual activity: Yes     Partners: Male     Birth control/ protection:  None     Other Topics Concern    Not on file     Social History Narrative    No narrative on file         Chemistry    No results found for: NA, K, CL, CO2, BUN, CREATININE, GLU No results found for: CALCIUM, ALKPHOS, AST, ALT, BILITOT         Lab Results   Component Value Date    WBC 10.74 05/23/2017    HGB 11.5 (L) 05/23/2017    HCT 34.9 (L) 05/23/2017    MCV 91 05/23/2017     05/23/2017       No results for input(s): INR, PROTIME, APTT in the last 72 hours.    Invalid input(s): PT                  Anesthesia Evaluation    I have reviewed the Patient Summary Reports.    I have reviewed the Nursing Notes.   I have reviewed the Medications.     Review of Systems  Anesthesia Hx:  History of prior surgery of interest to airway management or planning:  Denies Personal Hx of Anesthesia complications.   Cardiovascular:  Cardiovascular Normal     Pulmonary:  Pulmonary Normal    Renal/:  Renal/ Normal     Hepatic/GI:  Hepatic/GI Normal    Neurological:  Neurology Normal    Endocrine:  Endocrine Normal        Physical Exam  General:  Well nourished    Airway/Jaw/Neck:  Airway Findings: Mouth Opening: Normal Tongue: Normal  General Airway Assessment: Adult  Mallampati: III  Improves to II with phonation.  TM Distance: Normal, at least 6 cm  Jaw/Neck Findings:  Neck ROM: Normal ROM  Neck Findings: Normal    Eyes/Ears/Nose:  EYES/EARS/NOSE FINDINGS: Normal   Dental:  Dental Findings: In tact   Chest/Lungs:  Chest/Lungs Findings: Clear to auscultation, Normal Respiratory Rate     Heart/Vascular:  Heart Findings: Rate: Normal  Rhythm: Regular Rhythm  Sounds: Normal  Heart murmur: negative       Mental Status:  Mental Status Findings:  Cooperative, Alert and Oriented         Anesthesia Plan  Type of Anesthesia, risks & benefits discussed:  Anesthesia Type:  CSE, epidural, general, spinal  Patient's Preference:   Intra-op Monitoring Plan: standard ASA monitors  Intra-op Monitoring Plan Comments:   Post Op Pain Control  Plan:   Post Op Pain Control Plan Comments:   Induction:    Beta Blocker:  Patient is not currently on a Beta-Blocker (No further documentation required).       Informed Consent: Patient understands risks and agrees with Anesthesia plan.  Questions answered. Anesthesia consent signed with patient.  ASA Score: 2     Day of Surgery Review of History & Physical:            Ready For Surgery From Anesthesia Perspective.

## 2017-06-17 LAB
ANISOCYTOSIS BLD QL SMEAR: SLIGHT
BASOPHILS # BLD AUTO: ABNORMAL K/UL
BASOPHILS NFR BLD: 0 %
DIFFERENTIAL METHOD: ABNORMAL
EOSINOPHIL # BLD AUTO: ABNORMAL K/UL
EOSINOPHIL NFR BLD: 2 %
ERYTHROCYTE [DISTWIDTH] IN BLOOD BY AUTOMATED COUNT: 15.3 %
HCT VFR BLD AUTO: 39.1 %
HGB BLD-MCNC: 13.1 G/DL
LYMPHOCYTES # BLD AUTO: ABNORMAL K/UL
LYMPHOCYTES NFR BLD: 9 %
MCH RBC QN AUTO: 30.2 PG
MCHC RBC AUTO-ENTMCNC: 33.5 %
MCV RBC AUTO: 90 FL
METAMYELOCYTES NFR BLD MANUAL: 2 %
MONOCYTES # BLD AUTO: ABNORMAL K/UL
MONOCYTES NFR BLD: 5 %
NEUTROPHILS NFR BLD: 80 %
NEUTS BAND NFR BLD MANUAL: 2 %
PLATELET # BLD AUTO: 200 K/UL
PLATELET BLD QL SMEAR: ABNORMAL
PMV BLD AUTO: 10.8 FL
POLYCHROMASIA BLD QL SMEAR: ABNORMAL
RBC # BLD AUTO: 4.34 M/UL
WBC # BLD AUTO: 24.03 K/UL

## 2017-06-17 PROCEDURE — 11000001 HC ACUTE MED/SURG PRIVATE ROOM

## 2017-06-17 PROCEDURE — 36415 COLL VENOUS BLD VENIPUNCTURE: CPT

## 2017-06-17 PROCEDURE — 25000003 PHARM REV CODE 250: Performed by: OBSTETRICS & GYNECOLOGY

## 2017-06-17 PROCEDURE — 85007 BL SMEAR W/DIFF WBC COUNT: CPT

## 2017-06-17 PROCEDURE — 85027 COMPLETE CBC AUTOMATED: CPT

## 2017-06-17 RX ORDER — IBUPROFEN 600 MG/1
600 TABLET ORAL EVERY 6 HOURS PRN
Qty: 30 TABLET | Refills: 2 | Status: SHIPPED | OUTPATIENT
Start: 2017-06-17

## 2017-06-17 RX ADMIN — OXYCODONE HYDROCHLORIDE AND ACETAMINOPHEN 1 TABLET: 5; 325 TABLET ORAL at 08:06

## 2017-06-17 RX ADMIN — IBUPROFEN 600 MG: 600 TABLET, FILM COATED ORAL at 08:06

## 2017-06-17 RX ADMIN — IBUPROFEN 600 MG: 600 TABLET, FILM COATED ORAL at 01:06

## 2017-06-17 RX ADMIN — IBUPROFEN 600 MG: 600 TABLET, FILM COATED ORAL at 03:06

## 2017-06-17 NOTE — PLAN OF CARE
Problem: Patient Care Overview  Goal: Plan of Care Review  Outcome: Ongoing (interventions implemented as appropriate)  With patient's permission assisted with breastfeeding; developed the following breastfeeding plan of care with patient: she will breastfeed baby on cue until content at least 8 times in 24 hours observing for signs of milk transfer; she will wake baby prn; she will avoid bottles, formula and pacifiers;

## 2017-06-17 NOTE — DISCHARGE INSTRUCTIONS
Breastfeeding Discharge Instructions       Feed the baby at the earliest sign of hunger or comfort  o Hands to mouth, sucking motions  o Rooting or searching for something to suck on  o Dont wait for crying - it is a sign of distress     The feedings may be 8-12 times per 24hrs and will not follow a schedule   Avoid pacifiers and bottles for the first 4 weeks   Alternate the breast you start the feeding with, or start with the breast that feels the fullest   Switch breasts when the baby takes himself off the breast or falls asleep   Keep offering breasts until the baby looks full, no longer gives hunger signs, and stays asleep when placed on his back in the crib   If the baby is sleepy and wont wake for a feeding, put the baby skin-to-skin dressed in a diaper against the mothers bare chest   Sleep near your baby   The baby should be positioned and latched on to the breast correctly  o Chest-to-chest, chin in the breast  o Babys lips are flipped outward  o Babys mouth is stretched open wide like a shout  o Babys sucking should feel like tugging to the mother  - The baby should be drinking at the breast:  o You should hear swallowing or gulping throughout the feeding  o You should see milk on the babys lips when he comes off the breast  o Your breasts should be softer when the baby is finished feeding  o The baby should look relaxed at the end of feedings  o After the 4th day and your milk is in:  o The babys poop should turn bright yellow and be loose, watery, and seedy  o The baby should have at least 3-4 poops the size of the palm of your hand per day  o The baby should have at least 5-6 wet diapers per day  o The urine should be light yellow in color  You should drink when you are thirsty and eat a healthy diet when you are    hungry.     Take naps to get the rest you need.   Take medications and/or drink alcohol only with permission of your obstetrician    or the babys pediatrician.  You can  also call the [facility] Risk [facility],   ([phone]), Monday-Friday, 8am-5pm Central time, to get the most   up-to-date evidence-based information on the use of medications during   pregnancy and breastfeeding.      The baby should be examined by a pediatrician at 3-5 days of age.  Once your   milk comes in, the baby should be gaining at least ½ - 1oz each day and should be back to birthweight no later than 10-14 days of age.          Community Resources    [facility] Breastfeeding Warmline: [phone]   Local [facility] clinics: provide incentives and breastpumps to eligible mothers  [employer] ([employer]LI):  mother-to-mother support group website        www.ContaAzul.Boxxet  Local La Leche League mother-to-mother support groups:        www.[employer]        La Leche League [facility]   Dr. Cale Adair website for latch videos and general information:        www.breastfeedinginc.ca  [facility] is a call center that provides information about the safety of taking medications while breastfeeding.  Call [phone], M-F, 8am-5pm, [place].  International Lactation Consultant Association provides resources for assistance:        www.ilca.org  [facility] Breastfeeding Coalition provides informationand resources for parents  and the community    http://ChristianaCareastfeeding.org     Antonietta Monsalve is a mom-to-mom support group:                             www.[employer].com//breastfeedng-support/  [employer] for Healthy Babies:  4-310-721-BABY(8238)  [employer]: a breastfeeding support group for women of color, [phone]

## 2017-06-17 NOTE — ASSESSMENT & PLAN NOTE
1. Postpartum care:  - Patient doing well. Continue routine management and advances.  - Continue PO pain meds. Pain well controlled.  - Heme: H/h 13/39. Stable.  VSS  - Encourage ambulation  - Contraception: will ask at pp visit  - Lactation: continue breastfeeding

## 2017-06-17 NOTE — PROGRESS NOTES
[facility]  Obstetrics  Postpartum Progress Note    Patient Name: Madyson Melendrez  MRN: [id number]  Admission Date: 2017  Hospital Length of Stay: 1 days  Attending Physician: Maya Sherwood MD  Primary Care Provider: Primary Doctor No    Subjective:     Principal Problem:, delivered, current hospitalization    Hospital course: 17- admitted for TOLAC.  Underwent successful .  No lacerations  17- PPD#1 s/p . Patient is doing well this morning. She denies nausea, vomiting, fever or chills.  Patient reports mild abdominal pain that is well relieved by oral pain medications. Lochia is mild to moderate  and decreasing. Patient is voiding without difficulty and ambulating with no difficulty. She has passed flatus, and has not had BM.    Patient is breastfeeding.    Objective:     Vital Signs (Most Recent):  Temp: 99.2 °F (37.3 °C) (17)  Pulse: (!) 54 (17)  Resp: 20 (17)  BP: 126/80 (17)  SpO2: 99 % (17) Vital Signs (24h Range):  Temp:  [97.5 °F (36.4 °C)-99.2 °F (37.3 °C)] 99.2 °F (37.3 °C)  Pulse:  [] 54  Resp:  [18-20] 20  SpO2:  [96 %-100 %] 99 %  BP: (109-153)/() 126/80     Weight: 63.5 kg (140 lb)  Body mass index is 22.6 kg/m².      Intake/Output Summary (Last 24 hours) at 17 06  Last data filed at 17 2230   Gross per 24 hour   Intake              503 ml   Output             3000 ml   Net            -2497 ml       Significant Labs:  Lab Results   Component Value Date    GROUPTRH A POS 2017    HEPBSAG Negative 2016    STREPBCULT No Group B Streptococcus isolated 2017       Recent Labs  Lab 17  06   HGB 13.1   HCT 39.1       CBC:   Recent Labs  Lab 17  06   WBC 24.03*   RBC 4.34   HGB 13.1   HCT 39.1      MCV 90   MCH 30.2   MCHC 33.5     I have personallly reviewed all pertinent lab results from the last 24 hours.    Physical Exam:   Constitutional:  She is oriented to person, place, and time. She appears well-developed and well-nourished. No distress.    HENT:   Head: Normocephalic and atraumatic.    Eyes: EOM are normal. Pupils are equal, round, and reactive to light.    Neck: Normal range of motion. Neck supple.    Cardiovascular: Normal rate, regular rhythm, normal heart sounds and intact distal pulses.     Pulmonary/Chest: Effort normal and breath sounds normal. No respiratory distress.        Abdominal: Soft. Bowel sounds are normal. There is no tenderness. There is no rebound and no guarding.             Musculoskeletal: Normal range of motion.       Neurological: She is alert and oriented to person, place, and time.    Skin: Skin is warm and dry. She is not diaphoretic.    Psychiatric: She has a normal mood and affect. Her behavior is normal. Judgment and thought content normal.       Assessment/Plan:     22 y.o. female  for:    , delivered    1. Postpartum care:  - Patient doing well. Continue routine management and advances.  - Continue PO pain meds. Pain well controlled.  - Heme: H/h 13/39. Stable.  VSS  - Encourage ambulation  - Contraception: will ask at pp visit  - Lactation: continue breastfeeding              History of  delivery affecting pregnancy    -s/p                  Disposition: As patient meets milestones, will plan to discharge tomorrow.    Em Armas MD  Obstetrics  [facility]    Doing well, no questions,no problems.  I have reviewed the resident's note, evaluated the patient and agree with the diagnosis and management plan

## 2017-06-17 NOTE — ANESTHESIA POSTPROCEDURE EVALUATION
"Anesthesia Post Evaluation    Patient: Madyson Melendrez    Procedure(s) Performed: * No procedures listed *    Final Anesthesia Type: epidural  Patient location during evaluation: labor & delivery  Patient participation: Yes- Able to Participate  Level of consciousness: awake and alert  Post-procedure vital signs: reviewed and stable  Pain management: adequate  Airway patency: patent  PONV status at discharge: No PONV  Anesthetic complications: no      Cardiovascular status: blood pressure returned to baseline and hemodynamically stable  Respiratory status: unassisted, spontaneous ventilation and room air  Hydration status: euvolemic  Follow-up not needed.        Visit Vitals  /64 (Patient Position: Sitting, BP Method: Automatic)   Pulse 83   Temp 36.8 °C (98.2 °F) (Oral)   Resp 18   Ht 5' 6" (1.676 m)   Wt 63.5 kg (140 lb)   LMP 09/06/2016   SpO2 95%   Breastfeeding? Yes   BMI 22.60 kg/m²       Pain/Patricia Score: Pain Rating Prior to Med Admin: 4 (6/17/2017  1:07 PM)  Pain Rating Post Med Admin: 0 (6/17/2017  2:00 PM)      "

## 2017-06-17 NOTE — PLAN OF CARE
Problem: Patient Care Overview  Goal: Plan of Care Review  Outcome: Ongoing (interventions implemented as appropriate)  Fundus firm and midline with moderate bleeding. Pt up ad citlali and doing self cares. Adequate intake and output. No c/o pain. VSS No distress noted ;

## 2017-06-17 NOTE — SUBJECTIVE & OBJECTIVE
Hospital course: 17- admitted for TOLAC.  Underwent successful .  No lacerations  17- PPD#1 s/p . Patient is doing well this morning. She denies nausea, vomiting, fever or chills.  Patient reports mild abdominal pain that is well relieved by oral pain medications. Lochia is mild to moderate  and decreasing. Patient is voiding without difficulty and ambulating with no difficulty. She has passed flatus, and has not had BM.    Patient is breastfeeding.    Objective:     Vital Signs (Most Recent):  Temp: 99.2 °F (37.3 °C) (17)  Pulse: (!) 54 (17)  Resp: 20 (17)  BP: 126/80 (17)  SpO2: 99 % (17) Vital Signs (24h Range):  Temp:  [97.5 °F (36.4 °C)-99.2 °F (37.3 °C)] 99.2 °F (37.3 °C)  Pulse:  [] 54  Resp:  [18-20] 20  SpO2:  [96 %-100 %] 99 %  BP: (109-153)/() 126/80     Weight: 63.5 kg (140 lb)  Body mass index is 22.6 kg/m².      Intake/Output Summary (Last 24 hours) at 17 0643  Last data filed at 17 2230   Gross per 24 hour   Intake              503 ml   Output             3000 ml   Net            -2497 ml       Significant Labs:  Lab Results   Component Value Date    GROUPTRH A POS 2017    HEPBSAG Negative 2016    STREPBCULT No Group B Streptococcus isolated 2017       Recent Labs  Lab 17  0611   HGB 13.1   HCT 39.1       CBC:   Recent Labs  Lab 17  0611   WBC 24.03*   RBC 4.34   HGB 13.1   HCT 39.1      MCV 90   MCH 30.2   MCHC 33.5     I have personallly reviewed all pertinent lab results from the last 24 hours.    Physical Exam:   Constitutional: She is oriented to person, place, and time. She appears well-developed and well-nourished. No distress.    HENT:   Head: Normocephalic and atraumatic.    Eyes: EOM are normal. Pupils are equal, round, and reactive to light.    Neck: Normal range of motion. Neck supple.    Cardiovascular: Normal rate, regular rhythm, normal heart sounds and  intact distal pulses.     Pulmonary/Chest: Effort normal and breath sounds normal. No respiratory distress.        Abdominal: Soft. Bowel sounds are normal. There is no tenderness. There is no rebound and no guarding.             Musculoskeletal: Normal range of motion.       Neurological: She is alert and oriented to person, place, and time.    Skin: Skin is warm and dry. She is not diaphoretic.    Psychiatric: She has a normal mood and affect. Her behavior is normal. Judgment and thought content normal.

## 2017-06-17 NOTE — LACTATION NOTE
"   06/17/17 1445   Maternal Infant Assessment   Breast Density soft;Bilateral:   Areola elastic;Right:   Nipple(s) everted;Right:   Nipple Symptoms tender;right:   LATCH Score   Latch 2-->grasps breast, tongue down, lips flanged, rhythmic sucking   Audible Swallowing 2-->spontaneous and intermittent (24 hrs old)   Type Of Nipple 2-->everted (after stimulation)   Comfort (Breast/Nipple) 1-->filling, red/small blisters/bruises, mild/mod discomfort   Hold (Positioning) 0-->full assist (staff holds infant at breast)   Score (less than 7 for 2/more consecutive times, consult Lactation Consultant) 7   Pain/Comfort Assessments   Pain Assessment Performed Yes       Number Scale   Presence of Pain complains of pain/discomfort   Location - Side Right   Location nipple(s)   Pain Rating: Activity 3  (decreased to 1 with latch assistance)   Factors that Relieve Pain relaxation techniques;repositioning   Maternal Infant Feeding   Maternal Emotional State assist needed   Infant Positioning cross-cradle;clutch/"football"   Signs of Milk Transfer audible swallow;infant jaw motion present   Time Spent (min) 15-30 min   Latch Assistance yes   Breastfeeding Education adequate infant intake;importance of skin-to-skin contact   Feeding Infant   Feeding Readiness Cues finger sucking;rooting   Feeding Tolerance/Success alert for feeding;coordinated suck;coordinated swallow   Effective Latch During Feeding yes   Audible Swallow yes   Suck/Swallow Coordination present   Lactation Referrals   Lactation Consult Breastfeeding assessment;Initial assessment;Knowledge deficit   Lactation Interventions   Attachment Promotion breastfeeding assistance provided;counseling provided;family involvement promoted;privacy provided;role responsibility promoted;rooming-in promoted;skin-to-skin contact encouraged   Breastfeeding Assistance assisted with positioning;both breasts offered each feeding;feeding cue recognition promoted;feeding session " observed;infant latch-on verified;infant suck/swallow verified;support offered   Maternal Breastfeeding Support diary/feeding log utilized;encouragement offered;infant-mother separation minimized;lactation counseling provided;maternal hydration promoted;maternal nutrition promoted;maternal rest encouraged   Latch Promotion positioning assisted;infant moved to breast   Withpatient's permission assisted with breastfeeding; latched baby to right breast; baby actively sucking with wide mouth pauses; praise, support and encouragement provided to patient;

## 2017-06-18 VITALS
SYSTOLIC BLOOD PRESSURE: 126 MMHG | BODY MASS INDEX: 22.5 KG/M2 | HEIGHT: 66 IN | RESPIRATION RATE: 18 BRPM | TEMPERATURE: 99 F | OXYGEN SATURATION: 97 % | DIASTOLIC BLOOD PRESSURE: 79 MMHG | WEIGHT: 140 LBS | HEART RATE: 65 BPM

## 2017-06-18 PROBLEM — O34.219 VBAC, DELIVERED, CURRENT HOSPITALIZATION: Status: RESOLVED | Noted: 2017-06-16 | Resolved: 2017-06-18

## 2017-06-18 PROBLEM — O34.219 HISTORY OF CESAREAN DELIVERY AFFECTING PREGNANCY: Status: RESOLVED | Noted: 2017-06-16 | Resolved: 2017-06-18

## 2017-06-18 PROCEDURE — 25000003 PHARM REV CODE 250: Performed by: OBSTETRICS & GYNECOLOGY

## 2017-06-18 PROCEDURE — 99238 HOSP IP/OBS DSCHRG MGMT 30/<: CPT | Mod: ,,, | Performed by: OBSTETRICS & GYNECOLOGY

## 2017-06-18 RX ADMIN — OXYCODONE HYDROCHLORIDE AND ACETAMINOPHEN 1 TABLET: 5; 325 TABLET ORAL at 02:06

## 2017-06-18 NOTE — PLAN OF CARE
Problem: Patient Care Overview  Goal: Plan of Care Review  Outcome: Ongoing (interventions implemented as appropriate)  VSS, no acute distress or discomfort overnight, pain controlled well. Breastfeeding well, ambulating and voiding. Will continue to monitor.

## 2017-06-18 NOTE — LACTATION NOTE
"   06/18/17 1100   Maternal Infant Assessment   Breast Density soft;Bilateral:   Areola elastic;Bilateral:   Nipple(s) graspable;Right:   Nipple Symptoms tender;bilateral:   LATCH Score   Latch 2-->grasps breast, tongue down, lips flanged, rhythmic sucking   Audible Swallowing 2-->spontaneous and intermittent (24 hrs old)   Type Of Nipple 2-->everted (after stimulation)   Comfort (Breast/Nipple) 1-->filling, red/small blisters/bruises, mild/mod discomfort   Hold (Positioning) 2-->no assist from staff, mother able to position/hold infant   Score (less than 7 for 2/more consecutive times, consult Lactation Consultant) 9   Pain/Comfort Assessments   Pain Assessment Performed Yes       Number Scale   Presence of Pain complains of pain/discomfort   Location - Side Bilateral   Location nipple(s)   Pain Rating: Activity 1  (tenderness)   Factors that Relieve Pain relaxation techniques;repositioning   Maternal Infant Feeding   Maternal Emotional State independent   Infant Positioning clutch/"football"   Signs of Milk Transfer audible swallow;infant jaw motion present   Time Spent (min) 15-30 min   Comfort Measures Following Feeding expressed milk applied   Latch Assistance no   Feeding Infant   Effective Latch During Feeding yes   Audible Swallow yes   Suck/Swallow Coordination present   Lactation Referrals   Lactation Consult Breastfeeding assessment;Follow up;Knowledge deficit   Lactation Referrals pediatric care provider;outpatient lactation program   Lactation Interventions   Attachment Promotion counseling provided;face-to-face positioning promoted;family involvement promoted;infant-mother separation minimized;privacy provided;role responsibility promoted;rooming-in promoted;skin-to-skin contact encouraged   Breastfeeding Assistance feeding cue recognition promoted;feeding session observed;infant latch-on verified;infant suck/swallow verified;nipple shell utilized;support offered   Maternal Breastfeeding Support " diary/feeding log utilized;encouragement offered;infant-mother separation minimized;lactation counseling provided;maternal hydration promoted;maternal nutrition promoted;maternal rest encouraged   Patient breastfeeding baby now; she had latched baby independently; baby actively sucking with wide mouth pauses; provided lactation discharge education; reviewed Mother's Breastfeeding Guide; praise, support and encouragement provided to patient; her partner at bedside;;;

## 2017-06-18 NOTE — DISCHARGE SUMMARY
[facility]  Obstetrics  Discharge Summary      Patient Name: Madyson Melendrez  MRN: [id number]  Admission Date: 2017  Hospital Length of Stay: 2 days  Discharge Date and Time: No discharge date for patient encounter.  Attending Physician: Maya Sherwood MD   Discharging Provider: Aidan Simpson MD  Primary Care Provider: Primary Doctor No    HPI: Madyson Melendrez is a 22 y.o. [id number] at 39w2d presents complaining of contractions q5-10 minutes.   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  Patient reports contractions, denies vaginal bleeding, denies LOF.   Fetal Movement: normal.    * No surgery found *     Hospital Course:   17- admitted for TOLAC.  Underwent successful .  No lacerations  17- PPD#1 s/p . Patient is doing well this morning. She denies nausea, vomiting, fever or chills.  Patient reports mild abdominal pain that is well relieved by oral pain medications. Lochia is mild to moderate  and decreasing. Patient is voiding without difficulty and ambulating with no difficulty. She has passed flatus, and has not had BM.  Patient does plan to breast feed.  2017 - PPD 2 s/p . Meeting all milestones, no complaints. Stable for discharge.               Final Active Diagnoses:    Diagnosis Date Noted POA    PRINCIPAL PROBLEM:  , delivered, current hospitalization [O34.219] 2017 No    , delivered [O34.219] 2017 Yes      Problems Resolved During this Admission:    Diagnosis Date Noted Date Resolved POA    History of  delivery affecting pregnancy [O34.219] 2017 Yes        Labs:   CMP No results for input(s): NA, K, CL, CO2, GLU, BUN, CREATININE, CALCIUM, PROT, ALBUMIN, BILITOT, ALKPHOS, AST, ALT, ANIONGAP, ESTGFRAFRICA, EGFRNONAA in the last 48 hours. and CBC   Recent Labs  Lab 17  0858 17  0611   WBC 10.03 24.03*   HGB 12.4 13.1    HCT 36.9* 39.1    200       Feeding Method: breast    Immunizations     Date Immunization Status Dose Route/Site Given by    06/16/17 1805 MMR Incomplete 0.5 mL Subcutaneous/Left deltoid     06/16/17 1805 Tdap Incomplete 0.5 mL Intramuscular/Left deltoid           Delivery:    Episiotomy: None   Lacerations:     Repair suture:     Repair # of packets:     Blood loss (ml): 100     Birth information:  YOB: 2019   Time of birth: 4:25 PM   Sex: female   Delivery type: Vaginal, Spontaneous Delivery   Gestational Age: 39w2d    Delivery Clinician:      Other providers:       Additional  information:  Forceps:    Vacuum:    Breech:    Observed anomalies      Living?:           APGARS  One minute Five minutes Ten minutes   Skin color:         Heart rate:         Grimace:         Muscle tone:         Breathing:         Totals: 7  4  8      Placenta: Delivered:       appearance    Pending Diagnostic Studies:     None          Discharged Condition: good    Disposition: Home or Self Care    Follow Up:  Follow-up Information     Maya Sherwood MD. Schedule an appointment as soon as possible for a visit in 6 weeks.    Specialty:  Obstetrics and Gynecology  Why:  Post Partum Visit  Contact information:  [address] [facility] [address]  [phone]                 Patient Instructions:     Diet general     Call MD for:  temperature >100.4     Call MD for:  persistent nausea and vomiting or diarrhea     Call MD for:  severe uncontrolled pain     Call MD for:  redness, tenderness, or signs of infection (pain, swelling, redness, odor or green/yellow discharge around incision site)       Medications:  Current Discharge Medication List      START taking these medications    Details   ibuprofen (ADVIL,MOTRIN) 600 MG tablet Take 1 tablet (600 mg total) by mouth every 6 (six) hours as needed (cramping).  Qty: 30 tablet, Refills: 2         CONTINUE these medications which have NOT CHANGED    Details    ferrous sulfate 325 mg (65 mg iron) Tab tablet Take 1 tablet (325 mg total) by mouth once daily.  Qty: 30 tablet, Refills: 3    Associated Diagnoses: Iron deficiency anemia secondary to inadequate dietary iron intake      SAWYER 250 mg/mL (1 mL) Oil       PNV with Ca no.36-iron-FA (ALLAN PRENATAL) 13.5-0.4 mg Cap Take 1 tablet by mouth once daily.  Qty: 30 each, Refills: 5    Associated Diagnoses: Normal pregnancy in first trimester             Aidan Simpson MD  Obstetrics  [facility]

## 2017-06-18 NOTE — PLAN OF CARE
Problem: Patient Care Overview  Goal: Plan of Care Review  Outcome: Ongoing (interventions implemented as appropriate)  Provided lactation discharge instructions;   patient will breastfeed baby on cue until content at least 8 times in 24 hours observing for signs of milk transfer; she will wake baby prn; she will avoid bottles, formula and pacifiers;

## 2017-06-18 NOTE — PROGRESS NOTES
[facility]  Obstetrics  Postpartum Progress Note    Patient Name: Madyson Melendrez  MRN: [id number]  Admission Date: 2017  Hospital Length of Stay: 2 days  Attending Physician: Maya Sherwood MD  Primary Care Provider: Primary Doctor No    Subjective:     Principal Problem:, delivered, current hospitalization    Hospital course: 17- admitted for TOLAC.  Underwent successful .  No lacerations  17- PPD#1 s/p . Patient is doing well this morning. She denies nausea, vomiting, fever or chills.  Patient reports mild abdominal pain that is well relieved by oral pain medications. Lochia is mild to moderate  and decreasing. Patient is voiding without difficulty and ambulating with no difficulty. She has passed flatus, and has not had BM.  2017 - PPD 2. Patient doing well, no complaints, meeting all post partum milestones.     Patient is breastfeeding.    Objective:     Vital Signs (Most Recent):  Temp: 97.9 °F (36.6 °C) (17 2349)  Pulse: 62 (17 2349)  Resp: 18 (17 234)  BP: 119/65 (179)  SpO2: 98 % (17) Vital Signs (24h Range):  Temp:  [97.9 °F (36.6 °C)-98.2 °F (36.8 °C)] 97.9 °F (36.6 °C)  Pulse:  [62-83] 62  Resp:  [18] 18  SpO2:  [95 %-98 %] 98 %  BP: (116-119)/(64-65) 119/65     Weight: 63.5 kg (140 lb)  Body mass index is 22.6 kg/m².    No intake or output data in the 24 hours ending 17 0713    Significant Labs:  Lab Results   Component Value Date    GROUPTRH A POS 2017    HEPBSAG Negative 2016    STREPBCULT No Group B Streptococcus isolated 2017       Recent Labs  Lab 17  0611   HGB 13.1   HCT 39.1       CBC:     Recent Labs  Lab 17  0611   WBC 24.03*   RBC 4.34   HGB 13.1   HCT 39.1      MCV 90   MCH 30.2   MCHC 33.5     I have personallly reviewed all pertinent lab results from the last 24 hours.    Physical Exam:   Constitutional: She is oriented to person, place, and time. She  appears well-developed and well-nourished. No distress.    HENT:   Head: Normocephalic and atraumatic.    Eyes: EOM are normal. Pupils are equal, round, and reactive to light.    Neck: Normal range of motion. Neck supple.    Cardiovascular: Normal rate, regular rhythm, normal heart sounds and intact distal pulses.     Pulmonary/Chest: Effort normal and breath sounds normal. No respiratory distress.        Abdominal: Soft. Bowel sounds are normal. There is no tenderness. There is no rebound and no guarding.             Musculoskeletal: Normal range of motion.       Neurological: She is alert and oriented to person, place, and time.    Skin: Skin is warm and dry. She is not diaphoretic.    Psychiatric: She has a normal mood and affect. Her behavior is normal. Judgment and thought content normal.       Assessment/Plan:     22 y.o. female  for:    , delivered    1. Postpartum care:  - Patient doing well. Continue routine management and advances.  - Continue PO pain meds. Pain well controlled.  - Heme: H/h 13/39. Stable.  VSS  - Encourage ambulation  - Contraception: will ask at pp visit  - Lactation: continue breastfeeding                  Disposition: As patient meets milestones, will plan to discharge today.    Aidan Simpson MD  Obstetrics  [facility]

## 2017-06-18 NOTE — SUBJECTIVE & OBJECTIVE
Hospital course: 17- admitted for TOLAC.  Underwent successful .  No lacerations  17- PPD#1 s/p . Patient is doing well this morning. She denies nausea, vomiting, fever or chills.  Patient reports mild abdominal pain that is well relieved by oral pain medications. Lochia is mild to moderate  and decreasing. Patient is voiding without difficulty and ambulating with no difficulty. She has passed flatus, and has not had BM.  2017 - PPD 2. Patient doing well, no complaints, meeting all post partum milestones.     Patient is breastfeeding.    Objective:     Vital Signs (Most Recent):  Temp: 97.9 °F (36.6 °C) (17 234)  Pulse: 62 (17 234)  Resp: 18 (17)  BP: 119/65 (17)  SpO2: 98 % (17) Vital Signs (24h Range):  Temp:  [97.9 °F (36.6 °C)-98.2 °F (36.8 °C)] 97.9 °F (36.6 °C)  Pulse:  [62-83] 62  Resp:  [18] 18  SpO2:  [95 %-98 %] 98 %  BP: (116-119)/(64-65) 119/65     Weight: 63.5 kg (140 lb)  Body mass index is 22.6 kg/m².    No intake or output data in the 24 hours ending 17 0713    Significant Labs:  Lab Results   Component Value Date    GROUPTRH A POS 2017    HEPBSAG Negative 2016    STREPBCULT No Group B Streptococcus isolated 2017       Recent Labs  Lab 17  0611   HGB 13.1   HCT 39.1       CBC:     Recent Labs  Lab 17  0611   WBC 24.03*   RBC 4.34   HGB 13.1   HCT 39.1      MCV 90   MCH 30.2   MCHC 33.5     I have personallly reviewed all pertinent lab results from the last 24 hours.    Physical Exam:   Constitutional: She is oriented to person, place, and time. She appears well-developed and well-nourished. No distress.    HENT:   Head: Normocephalic and atraumatic.    Eyes: EOM are normal. Pupils are equal, round, and reactive to light.    Neck: Normal range of motion. Neck supple.    Cardiovascular: Normal rate, regular rhythm, normal heart sounds and intact distal pulses.     Pulmonary/Chest: Effort  normal and breath sounds normal. No respiratory distress.        Abdominal: Soft. Bowel sounds are normal. There is no tenderness. There is no rebound and no guarding.             Musculoskeletal: Normal range of motion.       Neurological: She is alert and oriented to person, place, and time.    Skin: Skin is warm and dry. She is not diaphoretic.    Psychiatric: She has a normal mood and affect. Her behavior is normal. Judgment and thought content normal.

## 2017-07-17 ENCOUNTER — TELEPHONE (OUTPATIENT)
Dept: OBSTETRICS AND GYNECOLOGY | Facility: CLINIC | Age: 22
End: 2017-07-17

## 2017-07-17 NOTE — TELEPHONE ENCOUNTER
----- Message from Kulwant De La Cruz sent at 7/17/2017 10:46 AM CDT -----  PLEASE CALL PT REGARDING HER [place] PAPERS  [phone]

## 2017-07-17 NOTE — TELEPHONE ENCOUNTER
Called pt and talked to her about her [facility] paperwork. Pt asked if paperwork was done. I informed pt that they were sent to Disability and they should be faxed to the number that was on the paper. If she does not receive anything soon I will fax paperwork to her. Also scheduled pt pp appt. Pt verbalized understanding.

## 2017-07-28 ENCOUNTER — POSTPARTUM VISIT (OUTPATIENT)
Dept: OBSTETRICS AND GYNECOLOGY | Facility: CLINIC | Age: 22
End: 2017-07-28
Payer: MEDICAID

## 2017-07-28 VITALS — WEIGHT: 116.88 LBS | BODY MASS INDEX: 18.79 KG/M2 | HEIGHT: 66 IN

## 2017-07-28 DIAGNOSIS — Z30.011 ENCOUNTER FOR INITIAL PRESCRIPTION OF CONTRACEPTIVE PILLS: ICD-10-CM

## 2017-07-28 PROCEDURE — 99212 OFFICE O/P EST SF 10 MIN: CPT | Mod: PBBFAC | Performed by: OBSTETRICS & GYNECOLOGY

## 2017-07-28 PROCEDURE — 99999 PR PBB SHADOW E&M-EST. PATIENT-LVL II: CPT | Mod: PBBFAC,,, | Performed by: OBSTETRICS & GYNECOLOGY

## 2017-07-28 RX ORDER — ACETAMINOPHEN AND CODEINE PHOSPHATE 120; 12 MG/5ML; MG/5ML
1 SOLUTION ORAL DAILY
Qty: 30 TABLET | Refills: 11 | Status: SHIPPED | OUTPATIENT
Start: 2017-07-28 | End: 2018-07-28

## 2017-07-28 NOTE — PROGRESS NOTES
Subjective:       Patient ID: Madyson Melendrez is a 22 y.o. female.    Chief Complaint:  Postpartum Follow-up (6 weeks PP / 2017 vaginal delivery )      History of Present Illness       Madyson Melendrez is a 22 y.o. female  presents for a postpartum visit.  She is status post  6 weeks ago.  Her hospitalization was complicated.  She is breastfeeding.  She desires oral progesterone-only contraceptive for contraception.  She denies postpartum depression.    Her last pap was 2016, normal    Review of Systems  Review of Systems   Constitutional: Negative for appetite change.   Eyes: Negative for visual disturbance.   Respiratory: Negative for cough and shortness of breath.    Cardiovascular: Negative for chest pain.   Gastrointestinal: Negative for abdominal pain, constipation and diarrhea.   Genitourinary: Negative for difficulty urinating, dysuria, frequency, genital sores, pelvic pain, vaginal bleeding, vaginal discharge and vaginal pain.   Neurological: Negative for dizziness, light-headedness and headaches.   Psychiatric/Behavioral: Negative for dysphoric mood.           Objective:    Physical Exam   Constitutional: She is oriented to person, place, and time. She appears well-developed and well-nourished.   Pulmonary/Chest: Effort normal.   Abdominal: Soft.   Genitourinary: Vagina normal and uterus normal. No labial fusion. There is no rash, tenderness, lesion or injury on the right labia. There is no rash, tenderness, lesion or injury on the left labia. Uterus is not deviated, not enlarged, not fixed and not tender. Cervix exhibits no motion tenderness, no discharge and no friability. Right adnexum displays no mass, no tenderness and no fullness. Left adnexum displays no mass, no tenderness and no fullness. No erythema, tenderness or bleeding in the vagina. No foreign body in the vagina. No signs of injury around the vagina. No vaginal discharge found.   Neurological: She is alert and oriented to person,  place, and time.   Psychiatric: She has a normal mood and affect. Her behavior is normal. Judgment and thought content normal.   Nursing note and vitals reviewed.        Assessment:        1. Postpartum state    2. Encounter for initial prescription of contraceptive pills               Plan:        No orders of the defined types were placed in this encounter.      Medications Ordered This Encounter      norethindrone (ORTHO MICRONOR) 0.35 mg tablet    Breastfeeding is going well.   Pap up to date and no signs of depression.   Plan for ortho micronor for contraception.      Return for annual or prn.    Maya Sherwood MD  [facility]  [phone]

## 2018-01-01 PROBLEM — O09.292 HISTORY OF FETAL ANOMALY IN PRIOR PREGNANCY, CURRENTLY PREGNANT IN SECOND TRIMESTER: Status: RESOLVED | Noted: 2017-01-25 | Resolved: 2018-01-01

## 2019-03-14 ENCOUNTER — OFFICE VISIT (OUTPATIENT)
Dept: URGENT CARE | Facility: CLINIC | Age: 24
End: 2019-03-14
Payer: MEDICAID

## 2019-03-14 VITALS
DIASTOLIC BLOOD PRESSURE: 72 MMHG | RESPIRATION RATE: 18 BRPM | BODY MASS INDEX: 18.64 KG/M2 | HEART RATE: 103 BPM | TEMPERATURE: 98 F | WEIGHT: 116 LBS | SYSTOLIC BLOOD PRESSURE: 121 MMHG | OXYGEN SATURATION: 97 % | HEIGHT: 66 IN

## 2019-03-14 DIAGNOSIS — N91.0 MENSTRUATION DELAY: Primary | ICD-10-CM

## 2019-03-14 DIAGNOSIS — H10.13 ALLERGIC CONJUNCTIVITIS OF BOTH EYES AND RHINITIS: ICD-10-CM

## 2019-03-14 DIAGNOSIS — J30.9 ALLERGIC CONJUNCTIVITIS OF BOTH EYES AND RHINITIS: ICD-10-CM

## 2019-03-14 LAB
B-HCG UR QL: NEGATIVE
CTP QC/QA: YES

## 2019-03-14 PROCEDURE — 3008F BODY MASS INDEX DOCD: CPT | Mod: CPTII,S$GLB,, | Performed by: NURSE PRACTITIONER

## 2019-03-14 PROCEDURE — 99213 PR OFFICE/OUTPT VISIT, EST, LEVL III, 20-29 MIN: ICD-10-PCS | Mod: S$GLB,,, | Performed by: NURSE PRACTITIONER

## 2019-03-14 PROCEDURE — 3008F PR BODY MASS INDEX (BMI) DOCUMENTED: ICD-10-PCS | Mod: CPTII,S$GLB,, | Performed by: NURSE PRACTITIONER

## 2019-03-14 PROCEDURE — 99213 OFFICE O/P EST LOW 20 MIN: CPT | Mod: S$GLB,,, | Performed by: NURSE PRACTITIONER

## 2019-03-14 PROCEDURE — 81025 URINE PREGNANCY TEST: CPT | Mod: S$GLB,,, | Performed by: NURSE PRACTITIONER

## 2019-03-14 PROCEDURE — 81025 POCT URINE PREGNANCY: ICD-10-PCS | Mod: S$GLB,,, | Performed by: NURSE PRACTITIONER

## 2019-03-14 RX ORDER — OLOPATADINE HYDROCHLORIDE 2 MG/ML
1 SOLUTION/ DROPS OPHTHALMIC DAILY
Qty: 2.5 ML | Refills: 1 | Status: SHIPPED | OUTPATIENT
Start: 2019-03-14 | End: 2020-03-13

## 2019-03-14 RX ORDER — FLUTICASONE PROPIONATE 50 MCG
1 SPRAY, SUSPENSION (ML) NASAL DAILY
Qty: 1 BOTTLE | Refills: 0 | Status: SHIPPED | OUTPATIENT
Start: 2019-03-14

## 2019-03-14 RX ORDER — CETIRIZINE HYDROCHLORIDE 10 MG/1
TABLET ORAL
Qty: 30 TABLET | Refills: 1 | Status: SHIPPED | OUTPATIENT
Start: 2019-03-14

## 2019-03-14 NOTE — PROGRESS NOTES
"Subjective:       Patient ID: Madyson Melendrez is a 23 y.o. female.    Vitals:  height is 5' 6" (1.676 m) and weight is 52.6 kg (116 lb). Her oral temperature is 98 °F (36.7 °C). Her blood pressure is 121/72 and her pulse is 103. Her respiration is 18 and oxygen saturation is 97%.     Chief Complaint: Eye Problem and URI    Madyson complains of eye pain, redness, itching, and swelling for the last week. She states her vision became blurry today and feels like there's a "film" on her eyes. She also complains of runny nose and a cough.        Eye Problem    Both eyes are affected.This is a new problem. The current episode started in the past 7 days. The problem occurs constantly. The problem has been gradually worsening. There was no injury mechanism. The pain is at a severity of 6/10. The pain is mild. There is no known exposure to pink eye. She does not wear contacts. Associated symptoms include blurred vision, an eye discharge, eye redness, a foreign body sensation, itching and a recent URI. Pertinent negatives include no double vision, fever, nausea, photophobia or vomiting. She has tried eye drops (clear eyes) for the symptoms. The treatment provided no relief.   URI    This is a new problem. The current episode started in the past 7 days. The problem has been gradually worsening. There has been no fever. Associated symptoms include congestion, coughing, rhinorrhea and sneezing. Pertinent negatives include no abdominal pain, chest pain, diarrhea, dysuria, ear pain, headaches, joint pain, joint swelling, nausea, neck pain, plugged ear sensation, rash, sinus pain, sore throat, swollen glands, vomiting or wheezing. Treatments tried: claritin. The treatment provided no relief.       Constitution: Negative for chills, sweating, fatigue and fever.   HENT: Positive for congestion. Negative for ear pain, sinus pain, sinus pressure, sore throat and voice change.    Neck: Negative for neck pain and painful lymph nodes. "   Cardiovascular: Negative for chest pain.   Eyes: Positive for eye discharge, eye itching, eye pain, eye redness, blurred vision and eyelid swelling. Negative for photophobia and double vision.   Respiratory: Positive for cough and sputum production. Negative for chest tightness, bloody sputum, COPD, shortness of breath, stridor, wheezing and asthma.    Gastrointestinal: Negative for abdominal pain, nausea, vomiting, constipation and diarrhea.   Genitourinary: Negative for dysuria.   Musculoskeletal: Negative for muscle ache.   Skin: Negative for rash.   Allergic/Immunologic: Positive for sneezing. Negative for seasonal allergies and asthma.   Neurological: Negative for headaches.   Hematologic/Lymphatic: Negative for swollen lymph nodes.       Objective:      Physical Exam   Constitutional: She is oriented to person, place, and time. She appears well-developed and well-nourished.   HENT:   Head: Normocephalic and atraumatic.   Right Ear: External ear normal.   Left Ear: External ear normal.   Nose: Nose normal.   Mouth/Throat: Oropharynx is clear and moist.   Eyes: Conjunctivae, EOM and lids are normal. Pupils are equal, round, and reactive to light. Right eye exhibits discharge. Left eye exhibits discharge.   Periorbital area dry with slight erythema   Neck: Trachea normal, full passive range of motion without pain and phonation normal. Neck supple.   Musculoskeletal: Normal range of motion.   Neurological: She is alert and oriented to person, place, and time.   Skin: Skin is warm, dry and intact.   Psychiatric: She has a normal mood and affect. Her speech is normal and behavior is normal. Judgment and thought content normal. Cognition and memory are normal.   Nursing note and vitals reviewed.      Assessment:       1. Menstruation delay    2. Allergic conjunctivitis of both eyes and rhinitis        Plan:         Menstruation delay  -     POCT urine pregnancy    Allergic conjunctivitis of both eyes and  rhinitis    Other orders  -     fluticasone (FLONASE) 50 mcg/actuation nasal spray; 1 spray (50 mcg total) by Each Nare route once daily.  Dispense: 1 Bottle; Refill: 0  -     cetirizine (ZYRTEC) 10 MG tablet; Take one tablet by mouth nightly.  Dispense: 30 tablet; Refill: 1  -     olopatadine (PATADAY) 0.2 % Drop; Place 1 drop into both eyes once daily.  Dispense: 2.5 mL; Refill: 1      Patient Instructions     Conjunctivitis, Allergic    Conjunctivitis is an irritation of a thin membrane in the eye. This membrane is called the conjunctiva. It covers the white of the eye and the inside of the eyelid. The condition is often known as pink eye or red eye because the eye looks pink or red. The eye can also be swollen. A thick fluid may leak from the eyelid. The eye may itch and burn, and feel gritty or scratchy.  Allergic conjunctivitis is caused by an allergen. Allergens are substances that cause the body to react with certain symptoms. Allergens that cause eye irritation include things such as house dust or pollen in the air. This can occur seasonally, most often in the spring.  Home care  · Eye drops may be prescribed to reduce itching and redness. Use these as directed. Otherwise, over-the-counter decongestant eye drops may be used.  · Apply a cool compress (towel soaked in cool water) to the affected eye 3 to 4 times a day to reduce swelling and itching.  · It is common to have mucus drainage during the night, causing the eyelids to become crusted by morning. Use a warm, wet cloth to wipe this away. You may also use saline irrigating solution or artificial tears to rinse away mucus in the eye. Do not patch the eye.  · You may use acetaminophen or ibuprofen to control pain, unless another medicine was prescribed. (Note: If you have chronic liver or kidney disease, or if you have ever had a stomach ulcer or gastrointestinal bleeding, talk with your healthcare provider before using these medicines.)  · Do not  wear contact lenses until your eyes have healed and all symptoms are gone.  Follow-up care  Follow up with your healthcare provider, or as advised.  When to seek medical advice  Call your healthcare provider right away if any of these occur:  · Increased eyelid swelling  · New or worsening drainage from the eye  · Increasing redness around the eye  · Facial swelling  Date Last Reviewed: 6/14/2015  © 2225-0806 [employer]. [address] [place] [address], [place] [address]. All rights reserved. This information is not intended as a substitute for professional medical care. Always follow your healthcare professional's instructions.

## 2019-03-14 NOTE — PATIENT INSTRUCTIONS
Conjunctivitis, Allergic    Conjunctivitis is an irritation of a thin membrane in the eye. This membrane is called the conjunctiva. It covers the white of the eye and the inside of the eyelid. The condition is often known as pink eye or red eye because the eye looks pink or red. The eye can also be swollen. A thick fluid may leak from the eyelid. The eye may itch and burn, and feel gritty or scratchy.  Allergic conjunctivitis is caused by an allergen. Allergens are substances that cause the body to react with certain symptoms. Allergens that cause eye irritation include things such as house dust or pollen in the air. This can occur seasonally, most often in the spring.  Home care  · Eye drops may be prescribed to reduce itching and redness. Use these as directed. Otherwise, over-the-counter decongestant eye drops may be used.  · Apply a cool compress (towel soaked in cool water) to the affected eye 3 to 4 times a day to reduce swelling and itching.  · It is common to have mucus drainage during the night, causing the eyelids to become crusted by morning. Use a warm, wet cloth to wipe this away. You may also use saline irrigating solution or artificial tears to rinse away mucus in the eye. Do not patch the eye.  · You may use acetaminophen or ibuprofen to control pain, unless another medicine was prescribed. (Note: If you have chronic liver or kidney disease, or if you have ever had a stomach ulcer or gastrointestinal bleeding, talk with your healthcare provider before using these medicines.)  · Do not wear contact lenses until your eyes have healed and all symptoms are gone.  Follow-up care  Follow up with your healthcare provider, or as advised.  When to seek medical advice  Call your healthcare provider right away if any of these occur:  · Increased eyelid swelling  · New or worsening drainage from the eye  · Increasing redness around the eye  · Facial swelling  Date Last Reviewed: 6/14/2015  © 5898-8425 [employer]  [employer]. [address] [place] [address], [place] [address]. All rights reserved. This information is not intended as a substitute for professional medical care. Always follow your healthcare professional's instructions.

## 2019-04-02 NOTE — ASSESSMENT & PLAN NOTE
1. Postpartum care:  - Patient doing well. Continue routine management and advances.  - Continue PO pain meds. Pain well controlled.  - Heme: H/h 13/39. Stable.  VSS  - Encourage ambulation  - Contraception: will ask at pp visit  - Lactation: continue breastfeeding         No...

## 2023-02-25 ENCOUNTER — OFFICE VISIT (OUTPATIENT)
Dept: URGENT CARE | Facility: CLINIC | Age: 28
End: 2023-02-25
Payer: COMMERCIAL

## 2023-02-25 VITALS
WEIGHT: 116 LBS | DIASTOLIC BLOOD PRESSURE: 76 MMHG | HEIGHT: 64 IN | OXYGEN SATURATION: 96 % | TEMPERATURE: 99 F | RESPIRATION RATE: 18 BRPM | HEART RATE: 92 BPM | BODY MASS INDEX: 19.81 KG/M2 | SYSTOLIC BLOOD PRESSURE: 106 MMHG

## 2023-02-25 DIAGNOSIS — U07.1 LAB TEST POSITIVE FOR DETECTION OF COVID-19 VIRUS: ICD-10-CM

## 2023-02-25 DIAGNOSIS — R51.9 INTRACTABLE HEADACHE, UNSPECIFIED CHRONICITY PATTERN, UNSPECIFIED HEADACHE TYPE: Primary | ICD-10-CM

## 2023-02-25 LAB
CTP QC/QA: YES
SARS-COV-2 AG RESP QL IA.RAPID: POSITIVE

## 2023-02-25 PROCEDURE — 1160F RVW MEDS BY RX/DR IN RCRD: CPT | Mod: CPTII,S$GLB,, | Performed by: FAMILY MEDICINE

## 2023-02-25 PROCEDURE — 3078F DIAST BP <80 MM HG: CPT | Mod: CPTII,S$GLB,, | Performed by: FAMILY MEDICINE

## 2023-02-25 PROCEDURE — 3008F PR BODY MASS INDEX (BMI) DOCUMENTED: ICD-10-PCS | Mod: CPTII,S$GLB,, | Performed by: FAMILY MEDICINE

## 2023-02-25 PROCEDURE — 3008F BODY MASS INDEX DOCD: CPT | Mod: CPTII,S$GLB,, | Performed by: FAMILY MEDICINE

## 2023-02-25 PROCEDURE — 99203 OFFICE O/P NEW LOW 30 MIN: CPT | Mod: S$GLB,,, | Performed by: FAMILY MEDICINE

## 2023-02-25 PROCEDURE — 87811 SARS CORONAVIRUS 2 ANTIGEN POCT, MANUAL READ: ICD-10-PCS | Mod: QW,S$GLB,, | Performed by: FAMILY MEDICINE

## 2023-02-25 PROCEDURE — 3074F PR MOST RECENT SYSTOLIC BLOOD PRESSURE < 130 MM HG: ICD-10-PCS | Mod: CPTII,S$GLB,, | Performed by: FAMILY MEDICINE

## 2023-02-25 PROCEDURE — 3078F PR MOST RECENT DIASTOLIC BLOOD PRESSURE < 80 MM HG: ICD-10-PCS | Mod: CPTII,S$GLB,, | Performed by: FAMILY MEDICINE

## 2023-02-25 PROCEDURE — 1159F PR MEDICATION LIST DOCUMENTED IN MEDICAL RECORD: ICD-10-PCS | Mod: CPTII,S$GLB,, | Performed by: FAMILY MEDICINE

## 2023-02-25 PROCEDURE — 3074F SYST BP LT 130 MM HG: CPT | Mod: CPTII,S$GLB,, | Performed by: FAMILY MEDICINE

## 2023-02-25 PROCEDURE — 1159F MED LIST DOCD IN RCRD: CPT | Mod: CPTII,S$GLB,, | Performed by: FAMILY MEDICINE

## 2023-02-25 PROCEDURE — 99203 PR OFFICE/OUTPT VISIT, NEW, LEVL III, 30-44 MIN: ICD-10-PCS | Mod: S$GLB,,, | Performed by: FAMILY MEDICINE

## 2023-02-25 PROCEDURE — 87811 SARS-COV-2 COVID19 W/OPTIC: CPT | Mod: QW,S$GLB,, | Performed by: FAMILY MEDICINE

## 2023-02-25 PROCEDURE — 1160F PR REVIEW ALL MEDS BY PRESCRIBER/CLIN PHARMACIST DOCUMENTED: ICD-10-PCS | Mod: CPTII,S$GLB,, | Performed by: FAMILY MEDICINE

## 2023-02-25 RX ORDER — BENZONATATE 100 MG/1
200 CAPSULE ORAL 3 TIMES DAILY PRN
Qty: 30 CAPSULE | Refills: 1 | Status: SHIPPED | OUTPATIENT
Start: 2023-02-25

## 2023-02-25 RX ORDER — LORATADINE 10 MG/1
10 TABLET ORAL DAILY
Qty: 30 TABLET | Refills: 2 | Status: SHIPPED | OUTPATIENT
Start: 2023-02-25 | End: 2024-02-25

## 2023-02-25 NOTE — LETTER
February 25, 2023      [facility]  [address] [place] [address]  Phone: [phone]  Fax: [phone]       Patient: Madyson Melendrez   YOB: 1995  Date of Visit: 02/25/2023    To Whom It May Concern:    Chadwick Melendrez  was at [facility] on 02/25/2023. The patient may return to work/school on 2/2/23   with no restrictions. If you have any questions or concerns, or if I can be of further assistance, please do not hesitate to contact me.    Sincerely,    Harris Young MD

## 2023-02-25 NOTE — PATIENT INSTRUCTIONS
Your test was POSITIVE for COVID-19 (coronavirus).       Please isolate yourself at home.  You may leave home and/or return to work once the following conditions are met:    If you were not hospitalized and are not moderately to severely immunocompromised:   More than 5 days since symptoms first appeared AND  More than 24 hours fever free without medications AND  Symptoms are improving  Continue to wear a mask around others for 5 additional days.    If you were hospitalized OR are moderately to severely immunocompromised:  More than 20 days since symptoms first appeared  More than 24 hours fever free without medications  Symptoms have improved    If you had no symptoms but tested positive:  More than 5 days since the date of the first positive test (20 days if moderately to severely immunocompromised). If you develop symptoms, then use the guidelines above.  Continue to wear a mask around others for 5 additional days.      Contact Tracing    As one of the next steps, you will receive a call or text from the [place] Department of Health ([facility]) COVID-19 Tracing Team. See the contact information below so you know not to ignore the health departments call. It is important that you contact them back immediately so they can help.      Contact Tracer Number:  [phone]  Caller ID for most carriers: [facility]     What is contact tracing?  Contact tracing is a process that helps identify everyone who has been in close contact with an infected person. Contact tracers let those people know they may have been exposed and guide them on next steps. Confidentiality is important for everyone; no one will be told who may have exposed them to the virus.  Your involvement is important. The more we know about where and how this virus is spreading, the better chance we have at stopping it from spreading further.  What does exposure mean?  Exposure means you have been within 6 feet for more than 15 minutes with a person who  has or had COVID-19.  What kind of questions do the contact tracers ask?  A contact tracer will confirm your basic contact information including name, address, phone number, and next of kin, as well as asking about any symptoms you may have had. Theyll also ask you how you think you may have gotten sick, such as places where you may have been exposed to the virus, and people you were with. Those names will never be shared with anyone outside of that call, and will only be used to help trace and stop the spread of the virus.   I have privacy concerns. How will the state use my information?  Your privacy about your health is important. All calls are completed using call centers that use the appropriate health privacy protection measures (HIPAA compliance), meaning that your patient information is safe. No one will ever ask you any questions related to immigration status. Your health comes first.   Do I have to participate?  You do not have to participate, but we strongly encourage you to. Contact tracing can help us catch and control new outbreaks as theyre developing to keep your friends and family safe.   What if I dont hear from anyone?  If you dont receive a call within 24 hours, you can call the number above right away to inquire about your status. That line is open from 8:00 am - 8:00 p.m., 7 days a week.  Contact tracing saves lives! Together, we have the power to beat this virus and keep our loved ones and neighbors safe.    For more information see CDC link below.      https://www.cdc.gov/coronavirus/2019-ncov/hcp/guidance-prevent-spread.html#precautions        Sources:  [facility], [place] [facility] and [facility]           Sincerely,     Harris Young MD

## 2023-02-25 NOTE — PROGRESS NOTES
"Subjective:       Patient ID: Madyson Melendrez is a 27 y.o. female.    Vitals:  height is 5' 4" (1.626 m) and weight is 52.6 kg (116 lb). Her temperature is 98.6 °F (37 °C). Her blood pressure is 106/76 and her pulse is 92. Her respiration is 18 and oxygen saturation is 96%.     Chief Complaint: Headache    Patient presents to the clinic with headache, fever x yesterday. Patient tested positive this morning     Cough  This is a new problem. The current episode started yesterday. The cough is Non-productive. Associated symptoms include a fever, headaches, myalgias, nasal congestion, postnasal drip and a sore throat. Treatments tried: nyquil, dayquil. The treatment provided no relief. There is no history of asthma.     Constitution: Positive for fever.   HENT:  Positive for postnasal drip and sore throat.    Respiratory:  Positive for cough.    Musculoskeletal:  Positive for muscle ache.   Neurological:  Positive for headaches.     Objective:      Physical Exam   Constitutional: She is oriented to person, place, and time. She appears well-developed. She is cooperative.   HENT:   Head: Normocephalic and atraumatic.   Ears:   Right Ear: Hearing, tympanic membrane, external ear and ear canal normal.   Left Ear: Hearing, tympanic membrane, external ear and ear canal normal.   Nose: Nose normal. No mucosal edema or nasal deformity. No epistaxis. Right sinus exhibits no maxillary sinus tenderness and no frontal sinus tenderness. Left sinus exhibits no maxillary sinus tenderness and no frontal sinus tenderness.   Mouth/Throat: Uvula is midline, oropharynx is clear and moist and mucous membranes are normal. Mucous membranes are moist. No trismus in the jaw. Normal dentition. No uvula swelling. No oropharyngeal exudate or posterior oropharyngeal erythema. Oropharynx is clear.   Eyes: Conjunctivae and lids are normal. Pupils are equal, round, and reactive to light. Extraocular movement intact   Neck: Trachea normal and phonation " normal. Neck supple.   Cardiovascular: Normal rate, regular rhythm, normal heart sounds and normal pulses.   Pulmonary/Chest: Effort normal and breath sounds normal.   Abdominal: Normal appearance and bowel sounds are normal. Soft.   Musculoskeletal: Normal range of motion.         General: Normal range of motion.   Neurological: She is alert and oriented to person, place, and time. She exhibits normal muscle tone.   Skin: Skin is warm, dry and intact.   Psychiatric: Her speech is normal and behavior is normal. Judgment and thought content normal.   Nursing note and vitals reviewed.      Assessment:       1. Intractable headache, unspecified chronicity pattern, unspecified headache type    2. Lab test positive for detection of COVID-19 virus            Plan:         Intractable headache, unspecified chronicity pattern, unspecified headache type  -     SARS Coronavirus 2 Antigen, POCT Manual Read    Lab test positive for detection of COVID-19 virus    Other orders  -     benzonatate (TESSALON PERLES) 100 MG capsule; Take 2 capsules (200 mg total) by mouth 3 (three) times daily as needed for Cough.  Dispense: 30 capsule; Refill: 1  -     loratadine (CLARITIN) 10 mg tablet; Take 1 tablet (10 mg total) by mouth once daily.  Dispense: 30 tablet; Refill: 2            Results for orders placed or performed in visit on 02/25/23   SARS Coronavirus 2 Antigen, POCT Manual Read   Result Value Ref Range    SARS Coronavirus 2 Antigen Positive (A) Negative     Acceptable Yes

## 2023-02-25 NOTE — LETTER
February 25, 2023      [facility]  [address] [place] [address]  Phone: [phone]  Fax: [phone]       Patient: Madyson Melendrez   YOB: 1995  Date of Visit: 02/25/2023    To Whom It May Concern:    Chadwick Melendrez  was at [facility] on 02/25/2023. The patient may return to work/school on 3/2/23   with no restrictions. If you have any questions or concerns, or if I can be of further assistance, please do not hesitate to contact me.    Sincerely,    Harris Young MD

## 2023-02-25 NOTE — LETTER
February 25, 2023      [facility]  [address] [place] [address]  Phone: [phone]  Fax: [phone]       Patient: Madyson Melendrez   YOB: 1995  Date of Visit: 02/25/2023    To Whom It May Concern:    Chadwick Melendrez  was at [facility] on 02/25/2023. The patient may return to work/school on 2/28/23   with no restrictions. If you have any questions or concerns, or if I can be of further assistance, please do not hesitate to contact me.    Sincerely,    Harris Young MD

## 2023-08-11 ENCOUNTER — PATIENT MESSAGE (OUTPATIENT)
Dept: RESEARCH | Facility: HOSPITAL | Age: 28
End: 2023-08-11
Payer: COMMERCIAL

## 2025-01-04 ENCOUNTER — OFFICE VISIT (OUTPATIENT)
Dept: URGENT CARE | Facility: CLINIC | Age: 30
End: 2025-01-04
Payer: MEDICAID

## 2025-01-04 VITALS
HEART RATE: 89 BPM | TEMPERATURE: 99 F | BODY MASS INDEX: 19.57 KG/M2 | HEIGHT: 64 IN | DIASTOLIC BLOOD PRESSURE: 71 MMHG | RESPIRATION RATE: 16 BRPM | OXYGEN SATURATION: 95 % | SYSTOLIC BLOOD PRESSURE: 104 MMHG | WEIGHT: 114.63 LBS

## 2025-01-04 DIAGNOSIS — J10.1 INFLUENZA A: Primary | ICD-10-CM

## 2025-01-04 DIAGNOSIS — R05.9 COUGH, UNSPECIFIED TYPE: ICD-10-CM

## 2025-01-04 LAB
CTP QC/QA: YES
POC MOLECULAR INFLUENZA A AGN: POSITIVE
POC MOLECULAR INFLUENZA B AGN: NEGATIVE

## 2025-01-04 PROCEDURE — 99213 OFFICE O/P EST LOW 20 MIN: CPT | Mod: S$GLB,,,

## 2025-01-04 PROCEDURE — 87502 INFLUENZA DNA AMP PROBE: CPT | Mod: QW,S$GLB,,

## 2025-01-04 RX ORDER — ALBUTEROL SULFATE 90 UG/1
1-2 INHALANT RESPIRATORY (INHALATION) EVERY 4 HOURS PRN
Qty: 18 G | Refills: 0 | Status: SHIPPED | OUTPATIENT
Start: 2025-01-04 | End: 2026-01-04

## 2025-01-04 RX ORDER — OSELTAMIVIR PHOSPHATE 75 MG/1
75 CAPSULE ORAL 2 TIMES DAILY
Qty: 10 CAPSULE | Refills: 0 | Status: SHIPPED | OUTPATIENT
Start: 2025-01-04 | End: 2025-01-09

## 2025-01-04 RX ORDER — PROMETHAZINE HYDROCHLORIDE AND DEXTROMETHORPHAN HYDROBROMIDE 6.25; 15 MG/5ML; MG/5ML
5 SYRUP ORAL EVERY 4 HOURS PRN
Qty: 118 ML | Refills: 0 | Status: SHIPPED | OUTPATIENT
Start: 2025-01-04 | End: 2025-01-14

## 2025-01-04 NOTE — PATIENT INSTRUCTIONS
The CDC also recommends added precautions in the 5 days after return to normal activity including frequent hand washing, mask wearing, physical distancing.      They also recommend should the patient develop a fever or starts to feel worse after they have returned to normal activities, they should return home and away from others for at least another 24 hours.     Please drink plenty of fluids.  Please get plenty of rest.  Please return here or go to the Emergency Department for any concerns or worsening of condition.  If you were prescribed antiviral, please take them to completion.  Recommended using albuterol inhaler at least once today and then starting tomorrow use as needed every 4-6 hours.  Use promethazine-dm for cough as needed. Recommended taking vitamin D and zinc supplements for immune support.   Recommended for patient to drink hot tea with honey. Consider eating softer foods such as soup and broth for the next couple of days to prevent further throat irritation. Recommended for patient to refrain from acidic foods (such as tomatoes or caffeine) to prevent throat irritation for the next couple of days.   Recommend humidifier, hot showers for sinus drainage. Recommend elevating your head at night with two pillows to prevent post nasal drip and cough at night. Recommend over the counter benadryl allergy plus congestion that includes a nasal decongestant in it if you do not have good results with nasal spray at bed time.     If you do not have Hypertension or any history of palpitations, it is ok to take over the counter Sudafed or Mucinex D or Allegra-D or Claritin-D or Zyrtec-D.  If you do take one of the above, it is ok to combine that with plain over the counter Mucinex or Allegra or Claritin or Zyrtec.  If for example you are taking Zyrtec -D, you can combine that with Mucinex, but not Mucinex-D.  If you are taking Mucinex-D, you can combine that with plain Allegra or Claritin or Zyrtec.   If you do  have Hypertension or palpitations, it is safe to take Coricidin HBP for relief of sinus symptoms.  If not allergic, please take over the counter Tylenol (Acetaminophen) and/or Motrin (Ibuprofen) as directed for control of pain and/or fever.  Please follow up with your primary care doctor or specialist as needed.    If you  smoke, please stop smoking.

## 2025-01-04 NOTE — LETTER
January 4, 2025      [facility] [place]  [place] [address]  Phone: [phone]  Fax: [phone]       Patient: Madyson Melendrez   YOB: 1995  Date of Visit: 01/04/2025    To Whom It May Concern:    Chadwick Melendrez  was at [facility] on 01/04/2025 and tested positive for Influenza A. The patient may return to work/school on 1/9/25 with no restrictions. If you have any questions or concerns, or if I can be of further assistance, please do not hesitate to contact me.    Sincerely,    Elisa Tillman PA-C

## 2025-01-04 NOTE — PROGRESS NOTES
"Subjective:      Patient ID: Madyson Melendrez is a 29 y.o. female.    Vitals:  height is 5' 4" (1.626 m) and weight is 52 kg (114 lb 10.2 oz). Her temperature is 99 °F (37.2 °C). Her blood pressure is 104/71 and her pulse is 89. Her respiration is 16 and oxygen saturation is 95%.     Chief Complaint: Cough    29-year-old female presents to the clinic today with chief complaint of fatigue, rhinorrhea, wheezing, cough, sneezing, body aches, and fever (max temp was 103). Symptoms started 2 days ago and have not improved.  Patient has taken delsym  and Advil with no relief.  Denies any recent ill exposures. Denies any recent travel.  Denies history of seasonal allergies. Denies hx of asthma.  Denies any pain or radiation of pain. Denies chills, chest pain, shortness of breath, abdominal pain, nausea, vomiting, diarrhea, or rashes.      Cough  This is a new problem. The current episode started in the past 7 days. The problem has been gradually worsening. The problem occurs constantly. The cough is Productive of sputum. Associated symptoms include a fever, headaches, myalgias, nasal congestion, postnasal drip and wheezing. Pertinent negatives include no chest pain, chills, ear pain, rash, sore throat or shortness of breath. Nothing aggravates the symptoms. She has tried OTC cough suppressant for the symptoms. The treatment provided no relief. There is no history of environmental allergies.     Constitution: Positive for fever. Negative for activity change, chills, sweating, fatigue, generalized weakness and international travel in last 60 days.   HENT:  Positive for postnasal drip. Negative for ear pain and sore throat.    Neck: Negative for neck pain.   Cardiovascular:  Negative for chest pain.   Eyes:  Negative for eye pain.   Respiratory:  Positive for cough and wheezing. Negative for chest tightness, sputum production, shortness of breath and asthma.    Gastrointestinal:  Negative for abdominal pain, nausea, vomiting and " diarrhea.   Genitourinary:  Negative for dysuria.   Musculoskeletal:  Positive for muscle ache. Negative for pain.   Skin:  Negative for rash.   Allergic/Immunologic: Negative for environmental allergies, seasonal allergies and asthma.   Neurological:  Positive for headaches. Negative for dizziness.   Psychiatric/Behavioral:  Negative for nervous/anxious. The patient is not nervous/anxious.       Objective:     Physical Exam   Constitutional: She is oriented to person, place, and time. She appears well-developed. She is cooperative.  Non-toxic appearance. She appears ill. No distress.   HENT:   Head: Normocephalic and atraumatic.   Ears:   Right Ear: Hearing, external ear and ear canal normal. A middle ear effusion is present.   Left Ear: Hearing, external ear and ear canal normal. A middle ear effusion is present.   Nose: Nose normal. No mucosal edema, rhinorrhea, purulent discharge or nasal deformity. No epistaxis. Right sinus exhibits no maxillary sinus tenderness and no frontal sinus tenderness. Left sinus exhibits no maxillary sinus tenderness and no frontal sinus tenderness.   Mouth/Throat: Uvula is midline, oropharynx is clear and moist and mucous membranes are normal. No trismus in the jaw. Normal dentition. No uvula swelling. No oropharyngeal exudate, posterior oropharyngeal edema, posterior oropharyngeal erythema, tonsillar abscesses or cobblestoning. Tonsils are 0 on the right. Tonsils are 0 on the left. No tonsillar exudate.   Eyes: Conjunctivae and lids are normal. No scleral icterus. Extraocular movement intact   Neck: Trachea normal and phonation normal. Neck supple. No edema present. No erythema present. No neck rigidity present.   Cardiovascular: Normal rate, regular rhythm, normal heart sounds and normal pulses.   Pulmonary/Chest: Effort normal. No stridor. No respiratory distress. She has no decreased breath sounds. She has wheezes. She has no rhonchi. She has no rales.   Abdominal: Normal  appearance.   Musculoskeletal: Normal range of motion.         General: No deformity. Normal range of motion.   Lymphadenopathy:     She has no cervical adenopathy.   Neurological: She is alert and oriented to person, place, and time. She exhibits normal muscle tone. Coordination normal.   Skin: Skin is warm, dry, intact, not diaphoretic and not pale.   Psychiatric: Her speech is normal and behavior is normal. Judgment and thought content normal.   Nursing note and vitals reviewed.      Assessment:     1. Influenza A    2. Cough, unspecified type        Results for orders placed or performed in visit on 01/04/25   POCT Influenza A/B MOLECULAR    Collection Time: 01/04/25 11:59 AM   Result Value Ref Range    POC Molecular Influenza A Ag Positive (A) Negative    POC Molecular Influenza B Ag Negative Negative     Acceptable Yes          Plan:       Influenza A  -     oseltamivir (TAMIFLU) 75 MG capsule; Take 1 capsule (75 mg total) by mouth 2 (two) times daily. for 5 days  Dispense: 10 capsule; Refill: 0  -     promethazine-dextromethorphan (PROMETHAZINE-DM) 6.25-15 mg/5 mL Syrp; Take 5 mLs by mouth every 4 (four) hours as needed (as needed for cough).  Dispense: 118 mL; Refill: 0  -     albuterol (VENTOLIN HFA) 90 mcg/actuation inhaler; Inhale 1-2 puffs into the lungs every 4 (four) hours as needed for Wheezing or Shortness of Breath. Rescue  Dispense: 18 g; Refill: 0    Cough, unspecified type  -     POCT Influenza A/B MOLECULAR  -     promethazine-dextromethorphan (PROMETHAZINE-DM) 6.25-15 mg/5 mL Syrp; Take 5 mLs by mouth every 4 (four) hours as needed (as needed for cough).  Dispense: 118 mL; Refill: 0  -     albuterol (VENTOLIN HFA) 90 mcg/actuation inhaler; Inhale 1-2 puffs into the lungs every 4 (four) hours as needed for Wheezing or Shortness of Breath. Rescue  Dispense: 18 g; Refill: 0